# Patient Record
Sex: FEMALE | Race: WHITE | NOT HISPANIC OR LATINO
[De-identification: names, ages, dates, MRNs, and addresses within clinical notes are randomized per-mention and may not be internally consistent; named-entity substitution may affect disease eponyms.]

---

## 2020-03-23 ENCOUNTER — APPOINTMENT (OUTPATIENT)
Dept: OBGYN | Facility: CLINIC | Age: 40
End: 2020-03-23

## 2020-05-27 ENCOUNTER — LABORATORY RESULT (OUTPATIENT)
Age: 40
End: 2020-05-27

## 2020-05-27 ENCOUNTER — OUTPATIENT (OUTPATIENT)
Dept: OUTPATIENT SERVICES | Facility: HOSPITAL | Age: 40
LOS: 1 days | End: 2020-05-27
Payer: MEDICAID

## 2020-05-27 ENCOUNTER — APPOINTMENT (OUTPATIENT)
Dept: OBGYN | Facility: CLINIC | Age: 40
End: 2020-05-27
Payer: MEDICAID

## 2020-05-27 VITALS — WEIGHT: 130 LBS | DIASTOLIC BLOOD PRESSURE: 70 MMHG | SYSTOLIC BLOOD PRESSURE: 120 MMHG

## 2020-05-27 DIAGNOSIS — Z00.00 ENCOUNTER FOR GENERAL ADULT MEDICAL EXAMINATION W/OUT ABNORMAL FINDINGS: ICD-10-CM

## 2020-05-27 DIAGNOSIS — N76.0 ACUTE VAGINITIS: ICD-10-CM

## 2020-05-27 PROCEDURE — 86780 TREPONEMA PALLIDUM: CPT

## 2020-05-27 PROCEDURE — 87591 N.GONORRHOEAE DNA AMP PROB: CPT

## 2020-05-27 PROCEDURE — G0463: CPT

## 2020-05-27 PROCEDURE — 87624 HPV HI-RISK TYP POOLED RSLT: CPT

## 2020-05-27 PROCEDURE — 99203 OFFICE O/P NEW LOW 30 MIN: CPT | Mod: GC

## 2020-05-27 PROCEDURE — 87491 CHLMYD TRACH DNA AMP PROBE: CPT

## 2020-05-27 PROCEDURE — 87389 HIV-1 AG W/HIV-1&-2 AB AG IA: CPT

## 2020-05-27 PROCEDURE — 87340 HEPATITIS B SURFACE AG IA: CPT

## 2020-05-28 LAB
C TRACH RRNA SPEC QL NAA+PROBE: SIGNIFICANT CHANGE UP
HBV SURFACE AG SER-ACNC: SIGNIFICANT CHANGE UP
HIV 1+2 AB+HIV1 P24 AG SERPL QL IA: SIGNIFICANT CHANGE UP
HPV HIGH+LOW RISK DNA PNL CVX: SIGNIFICANT CHANGE UP
N GONORRHOEA RRNA SPEC QL NAA+PROBE: SIGNIFICANT CHANGE UP
SPECIMEN SOURCE: SIGNIFICANT CHANGE UP
T PALLIDUM AB TITR SER: NEGATIVE — SIGNIFICANT CHANGE UP

## 2020-05-28 NOTE — HISTORY OF PRESENT ILLNESS
[___ Month(s) Ago] : [unfilled] month(s) ago [Good] : being in good health [Regular Exercise] : regular exercise [Healthy Diet] : a healthy diet [Reproductive Age] : is of reproductive age [HPV Vaccine NA Due to Age] : HPV vaccine not available to patient due to age [Dysmenorrhea] : dysmenorrhea [Non-opiod Analgesic] : improved by non-opiod analgesic [Definite:  ___ (Date)] : the last menstrual period was [unfilled] [Sexually Active] : is sexually active [Monogamous] : is monogamous [Male ___] : [unfilled] male [de-identified] : states that she saw her PCP [Fever] : no fever [Vomiting] : no vomiting [Nausea] : no nausea [Vaginal Bleeding] : no vaginal bleeding [Vaginal Discharge] : no vaginal discharge [Diarrhea] : no diarrhea [Pain with BMs] : no pain with bowel movements [Dysuria] : no dysuria [Contraception] : does not use contraception [Hot Flashes] : no hot flashes

## 2020-05-28 NOTE — END OF VISIT
[] : Resident [FreeTextEntry3] : Case discussed with resident, agree with management plan as above.\par \par Raquel Montalvo MD\par

## 2020-05-28 NOTE — PHYSICAL EXAM
[Alert] : alert [Awake] : awake [Soft] : soft [Oriented x3] : oriented to person, place, and time [No Lesions] : no genitalia lesions [Pink Rugae] : pink rugae [Labia Majora] : labia major [Normal] : cervix [No Bleeding] : there was no active vaginal bleeding [Acute Distress] : no acute distress [LAD] : no lymphadenopathy [Thyroid Nodule] : no thyroid nodule [Goiter] : no goiter [Nipple Discharge] : no nipple discharge [Mass] : no breast mass [Tender] : non tender [Distended] : not distended [Axillary LAD] : no axillary lymphadenopathy [H/Smegaly] : no hepatosplenomegaly [Depressed Mood] : not depressed [Flat Affect] : affect not flat [Adnexa Tenderness] : were not tender [Ovarian Mass (___ Cm)] : there were no adnexal masses [FreeTextEntry7] : mid-position, mobile, ~10cm, nontender, palpable smooth irregularity anteriorly likely fibroid

## 2020-05-29 DIAGNOSIS — Z80.3 FAMILY HISTORY OF MALIGNANT NEOPLASM OF BREAST: ICD-10-CM

## 2020-05-29 DIAGNOSIS — Z80.41 FAMILY HISTORY OF MALIGNANT NEOPLASM OF OVARY: ICD-10-CM

## 2020-05-29 DIAGNOSIS — D25.9 LEIOMYOMA OF UTERUS, UNSPECIFIED: ICD-10-CM

## 2020-05-29 LAB — CYTOLOGY SPEC DOC CYTO: SIGNIFICANT CHANGE UP

## 2020-06-08 ENCOUNTER — OUTPATIENT (OUTPATIENT)
Dept: OUTPATIENT SERVICES | Facility: HOSPITAL | Age: 40
LOS: 1 days | End: 2020-06-08
Payer: MEDICAID

## 2020-06-08 ENCOUNTER — APPOINTMENT (OUTPATIENT)
Dept: ULTRASOUND IMAGING | Facility: IMAGING CENTER | Age: 40
End: 2020-06-08
Payer: MEDICAID

## 2020-06-08 ENCOUNTER — APPOINTMENT (OUTPATIENT)
Dept: MAMMOGRAPHY | Facility: IMAGING CENTER | Age: 40
End: 2020-06-08
Payer: MEDICAID

## 2020-06-08 DIAGNOSIS — D25.9 LEIOMYOMA OF UTERUS, UNSPECIFIED: ICD-10-CM

## 2020-06-08 DIAGNOSIS — Z00.8 ENCOUNTER FOR OTHER GENERAL EXAMINATION: ICD-10-CM

## 2020-06-08 PROCEDURE — 77067 SCR MAMMO BI INCL CAD: CPT | Mod: 26

## 2020-06-08 PROCEDURE — 76856 US EXAM PELVIC COMPLETE: CPT | Mod: 26

## 2020-06-08 PROCEDURE — 76830 TRANSVAGINAL US NON-OB: CPT | Mod: 26

## 2020-06-08 PROCEDURE — 77063 BREAST TOMOSYNTHESIS BI: CPT

## 2020-06-08 PROCEDURE — 77063 BREAST TOMOSYNTHESIS BI: CPT | Mod: 26

## 2020-06-08 PROCEDURE — 76856 US EXAM PELVIC COMPLETE: CPT

## 2020-06-08 PROCEDURE — 76830 TRANSVAGINAL US NON-OB: CPT

## 2020-06-08 PROCEDURE — 77067 SCR MAMMO BI INCL CAD: CPT

## 2020-06-22 ENCOUNTER — TRANSCRIPTION ENCOUNTER (OUTPATIENT)
Age: 40
End: 2020-06-22

## 2021-03-29 ENCOUNTER — APPOINTMENT (OUTPATIENT)
Dept: ULTRASOUND IMAGING | Facility: CLINIC | Age: 41
End: 2021-03-29
Payer: MEDICAID

## 2021-03-29 ENCOUNTER — OUTPATIENT (OUTPATIENT)
Dept: OUTPATIENT SERVICES | Facility: HOSPITAL | Age: 41
LOS: 1 days | End: 2021-03-29
Payer: MEDICAID

## 2021-03-29 DIAGNOSIS — Z00.8 ENCOUNTER FOR OTHER GENERAL EXAMINATION: ICD-10-CM

## 2021-03-29 PROCEDURE — 76536 US EXAM OF HEAD AND NECK: CPT | Mod: 26

## 2021-03-29 PROCEDURE — 76536 US EXAM OF HEAD AND NECK: CPT

## 2021-05-31 ENCOUNTER — APPOINTMENT (OUTPATIENT)
Dept: MAMMOGRAPHY | Facility: IMAGING CENTER | Age: 41
End: 2021-05-31

## 2021-06-07 ENCOUNTER — RESULT REVIEW (OUTPATIENT)
Age: 41
End: 2021-06-07

## 2021-06-07 ENCOUNTER — APPOINTMENT (OUTPATIENT)
Dept: MAMMOGRAPHY | Facility: IMAGING CENTER | Age: 41
End: 2021-06-07
Payer: MEDICAID

## 2021-06-07 ENCOUNTER — OUTPATIENT (OUTPATIENT)
Dept: OUTPATIENT SERVICES | Facility: HOSPITAL | Age: 41
LOS: 1 days | End: 2021-06-07
Payer: MEDICAID

## 2021-06-07 DIAGNOSIS — Z00.8 ENCOUNTER FOR OTHER GENERAL EXAMINATION: ICD-10-CM

## 2021-06-07 PROCEDURE — 77067 SCR MAMMO BI INCL CAD: CPT | Mod: 26

## 2021-06-07 PROCEDURE — 77063 BREAST TOMOSYNTHESIS BI: CPT | Mod: 26

## 2021-06-07 PROCEDURE — 77067 SCR MAMMO BI INCL CAD: CPT

## 2021-06-07 PROCEDURE — 77063 BREAST TOMOSYNTHESIS BI: CPT

## 2021-06-09 DIAGNOSIS — Z12.39 ENCOUNTER FOR OTHER SCREENING FOR MALIGNANT NEOPLASM OF BREAST: ICD-10-CM

## 2021-08-17 ENCOUNTER — APPOINTMENT (OUTPATIENT)
Dept: SURGERY | Facility: CLINIC | Age: 41
End: 2021-08-17
Payer: MEDICAID

## 2021-08-17 VITALS
HEIGHT: 64 IN | BODY MASS INDEX: 20.49 KG/M2 | SYSTOLIC BLOOD PRESSURE: 102 MMHG | HEART RATE: 92 BPM | DIASTOLIC BLOOD PRESSURE: 67 MMHG | WEIGHT: 120 LBS

## 2021-08-17 DIAGNOSIS — Z80.3 FAMILY HISTORY OF MALIGNANT NEOPLASM OF BREAST: ICD-10-CM

## 2021-08-17 DIAGNOSIS — Z78.9 OTHER SPECIFIED HEALTH STATUS: ICD-10-CM

## 2021-08-17 DIAGNOSIS — Z80.8 FAMILY HISTORY OF MALIGNANT NEOPLASM OF OTHER ORGANS OR SYSTEMS: ICD-10-CM

## 2021-08-17 PROCEDURE — 99204 OFFICE O/P NEW MOD 45 MIN: CPT

## 2021-08-17 NOTE — HISTORY OF PRESENT ILLNESS
[de-identified] : Pt c/o thyroid cyst since March,  increasing in size, pain, hoarseness, Dysphagia and SOB.  denies RT exposure. \par sonogram: Left thyroid complex cyst 4.2 cm\par FNA (CBL) no follicular cells,  cyst with hemosiderin laden macrophages\par normal TFTs\par I have reviewed all old and new data and available images.\par

## 2021-08-17 NOTE — ASSESSMENT
[FreeTextEntry1] : in view of size, symptoms and tracheal deviation, have recommended thyroid lobectomy with frozen section, possible total thyroidectomy.  risks, benefits and alternatives discussed at length.  I have discussed with the patient the anatomy of the area, the pathophysiology of the disease process and the rationale for surgery.  The attendant risks, possible complications and expected postoperative course have been discussed in detail.  I have given the patient the opportunity to ask questions, and all questions have been answered to the patient's satisfaction, and they wish to proceed with the planned procedure.  to be scheduled ambulatory at [facility].  \par

## 2021-08-17 NOTE — CONSULT LETTER
[Dear  ___] : Dear  [unfilled], [Consult Letter:] : I had the pleasure of evaluating your patient, [unfilled]. [Please see my note below.] : Please see my note below. [Consult Closing:] : Thank you very much for allowing me to participate in the care of this patient.  If you have any questions, please do not hesitate to contact me. [Sincerely,] : Sincerely, [FreeTextEntry2] : Dr. Ynes Espinoza, Dr. Werner Whyte [DrArlene  ___] : Dr. WHITAKER [FreeTextEntry3] : Matthew Perera MD, FACS\par System Director, Endocrine Surgery\par [facility]\par Assistant Clinical Professor of Surgery\par [employer] [facility] [employer] [facility]

## 2021-08-17 NOTE — PHYSICAL EXAM
[de-identified] : 4.5 cm left thyroid nodule, well circumscribed and mobile [Laryngoscopy Performed] : laryngoscopy was performed, see procedure section for findings [R] : deviated to the right [Normal] : orientation to person, place, and time: normal [de-identified] : indirect  laryngoscopy shows normal vocal cord mobility bilaterally with no lesions noted

## 2021-10-15 ENCOUNTER — OUTPATIENT (OUTPATIENT)
Dept: OUTPATIENT SERVICES | Facility: HOSPITAL | Age: 41
LOS: 1 days | End: 2021-10-15

## 2021-10-15 VITALS
SYSTOLIC BLOOD PRESSURE: 110 MMHG | WEIGHT: 121.92 LBS | TEMPERATURE: 97 F | HEART RATE: 76 BPM | OXYGEN SATURATION: 99 % | HEIGHT: 61.5 IN | DIASTOLIC BLOOD PRESSURE: 70 MMHG | RESPIRATION RATE: 16 BRPM

## 2021-10-15 DIAGNOSIS — E04.1 NONTOXIC SINGLE THYROID NODULE: ICD-10-CM

## 2021-10-15 LAB
ANION GAP SERPL CALC-SCNC: 11 MMOL/L — SIGNIFICANT CHANGE UP (ref 7–14)
BLD GP AB SCN SERPL QL: NEGATIVE — SIGNIFICANT CHANGE UP
BUN SERPL-MCNC: 10 MG/DL — SIGNIFICANT CHANGE UP (ref 7–23)
CALCIUM SERPL-MCNC: 9 MG/DL — SIGNIFICANT CHANGE UP (ref 8.4–10.5)
CHLORIDE SERPL-SCNC: 103 MMOL/L — SIGNIFICANT CHANGE UP (ref 98–107)
CO2 SERPL-SCNC: 26 MMOL/L — SIGNIFICANT CHANGE UP (ref 22–31)
CREAT SERPL-MCNC: 0.86 MG/DL — SIGNIFICANT CHANGE UP (ref 0.5–1.3)
GLUCOSE SERPL-MCNC: 88 MG/DL — SIGNIFICANT CHANGE UP (ref 70–99)
HCG UR QL: NEGATIVE — SIGNIFICANT CHANGE UP
HCT VFR BLD CALC: 39.4 % — SIGNIFICANT CHANGE UP (ref 34.5–45)
HGB BLD-MCNC: 14.1 G/DL — SIGNIFICANT CHANGE UP (ref 11.5–15.5)
MCHC RBC-ENTMCNC: 31.3 PG — SIGNIFICANT CHANGE UP (ref 27–34)
MCHC RBC-ENTMCNC: 35.8 GM/DL — SIGNIFICANT CHANGE UP (ref 32–36)
MCV RBC AUTO: 87.6 FL — SIGNIFICANT CHANGE UP (ref 80–100)
NRBC # BLD: 0 /100 WBCS — SIGNIFICANT CHANGE UP
NRBC # FLD: 0 K/UL — SIGNIFICANT CHANGE UP
PLATELET # BLD AUTO: 240 K/UL — SIGNIFICANT CHANGE UP (ref 150–400)
POTASSIUM SERPL-MCNC: 3.6 MMOL/L — SIGNIFICANT CHANGE UP (ref 3.5–5.3)
POTASSIUM SERPL-SCNC: 3.6 MMOL/L — SIGNIFICANT CHANGE UP (ref 3.5–5.3)
RBC # BLD: 4.5 M/UL — SIGNIFICANT CHANGE UP (ref 3.8–5.2)
RBC # FLD: 11.7 % — SIGNIFICANT CHANGE UP (ref 10.3–14.5)
RH IG SCN BLD-IMP: NEGATIVE — SIGNIFICANT CHANGE UP
SODIUM SERPL-SCNC: 140 MMOL/L — SIGNIFICANT CHANGE UP (ref 135–145)
WBC # BLD: 5.02 K/UL — SIGNIFICANT CHANGE UP (ref 3.8–10.5)
WBC # FLD AUTO: 5.02 K/UL — SIGNIFICANT CHANGE UP (ref 3.8–10.5)

## 2021-10-15 RX ORDER — SODIUM CHLORIDE 9 MG/ML
1000 INJECTION, SOLUTION INTRAVENOUS
Refills: 0 | Status: DISCONTINUED | OUTPATIENT
Start: 2021-11-01 | End: 2021-11-15

## 2021-10-15 NOTE — H&P PST ADULT - PROBLEM SELECTOR PLAN 1
Left Thyroid Nodule, Possible Total, Possible Paratracheal Node Dissection    Pre op instructions reviewed with pt including Hibiclens with teach back and  Pepcid ; pt verbalized good understanding of pre op instructions    Cup provided for UCG dos

## 2021-10-15 NOTE — H&P PST ADULT - HISTORY OF PRESENT ILLNESS
Pt is a 41 y.o. female ; pt noted " bump left side thyroid " first noted 3/21. Pt states 5/21 noted increase in size ; Pt s/p drainage  and biopsy 5/21; pt referred to surgeon ; pt now presents for Left Thyroid Lobectomy possible Total , , Possib;e paratracheal Node Dissection  Pt is a 41 y.o. female ; pt noted " bump left side thyroid " first noted 3/21. Pt reports hoarseness Pt states "  it  increase in size"  ; Pt s/p drainage  and biopsy 5/21; pt referred to surgeon ; pt now presents for Left Thyroid Lobectomy possible Total  Possible Paratracheal Node Dissection  Pt is a 41 y.o. female ; pt noted " bump left side thyroid " first noted 3/21.  Pt states "  it  increased  in size"  ; Pt s/p drainage  and biopsy 5/21; pt referred to surgeon ; pt now presents for Left Thyroid Lobectomy possible Total  Possible Paratracheal Node Dissection     Pt reports h/o hoarseness

## 2021-10-15 NOTE — H&P PST ADULT - ENDOCRINE
details… No Repair - Repaired With Adjacent Surgical Defect Text (Leave Blank If You Do Not Want): After the excision the defect was repaired concurrently with another surgical defect which was in close approximation.

## 2021-10-15 NOTE — H&P PST ADULT - NSICDXFAMILYHX_GEN_ALL_CORE_FT
FAMILY HISTORY:  Sibling  Still living? Yes, Estimated age: Age Unknown  Family history of uterine cancer, Age at diagnosis: Age Unknown    Child  Still living? Yes, Estimated age: Age Unknown  FH: thyroid cancer, Age at diagnosis: Age Unknown

## 2021-10-25 PROBLEM — D25.9 LEIOMYOMA OF UTERUS, UNSPECIFIED: Chronic | Status: ACTIVE | Noted: 2021-10-15

## 2021-10-27 DIAGNOSIS — Z01.818 ENCOUNTER FOR OTHER PREPROCEDURAL EXAMINATION: ICD-10-CM

## 2021-10-29 ENCOUNTER — APPOINTMENT (OUTPATIENT)
Dept: DISASTER EMERGENCY | Facility: CLINIC | Age: 41
End: 2021-10-29

## 2021-10-29 LAB — SARS-COV-2 N GENE NPH QL NAA+PROBE: NOT DETECTED

## 2021-10-31 ENCOUNTER — TRANSCRIPTION ENCOUNTER (OUTPATIENT)
Age: 41
End: 2021-10-31

## 2021-11-01 ENCOUNTER — OUTPATIENT (OUTPATIENT)
Dept: OUTPATIENT SERVICES | Facility: HOSPITAL | Age: 41
LOS: 1 days | Discharge: HOME | End: 2021-11-01
Payer: MEDICAID

## 2021-11-01 ENCOUNTER — APPOINTMENT (OUTPATIENT)
Dept: SURGERY | Facility: HOSPITAL | Age: 41
End: 2021-11-01

## 2021-11-01 ENCOUNTER — RESULT REVIEW (OUTPATIENT)
Age: 41
End: 2021-11-01

## 2021-11-01 VITALS
HEART RATE: 89 BPM | SYSTOLIC BLOOD PRESSURE: 120 MMHG | RESPIRATION RATE: 17 BRPM | OXYGEN SATURATION: 98 % | DIASTOLIC BLOOD PRESSURE: 84 MMHG

## 2021-11-01 VITALS
HEIGHT: 61 IN | HEART RATE: 70 BPM | RESPIRATION RATE: 16 BRPM | SYSTOLIC BLOOD PRESSURE: 111 MMHG | TEMPERATURE: 99 F | WEIGHT: 121.92 LBS | OXYGEN SATURATION: 99 % | DIASTOLIC BLOOD PRESSURE: 81 MMHG

## 2021-11-01 DIAGNOSIS — E04.1 NONTOXIC SINGLE THYROID NODULE: ICD-10-CM

## 2021-11-01 LAB — HCG UR QL: NEGATIVE — SIGNIFICANT CHANGE UP

## 2021-11-01 PROCEDURE — 13132 CMPLX RPR F/C/C/M/N/AX/G/H/F: CPT | Mod: 59

## 2021-11-01 PROCEDURE — 60220 PARTIAL REMOVAL OF THYROID: CPT

## 2021-11-01 PROCEDURE — 88307 TISSUE EXAM BY PATHOLOGIST: CPT | Mod: 26

## 2021-11-01 PROCEDURE — 88173 CYTOPATH EVAL FNA REPORT: CPT | Mod: 26

## 2021-11-01 PROCEDURE — 88331 PATH CONSLTJ SURG 1 BLK 1SPC: CPT | Mod: 26

## 2021-11-01 PROCEDURE — 88305 TISSUE EXAM BY PATHOLOGIST: CPT | Mod: 26

## 2021-11-01 RX ORDER — BENZOCAINE AND MENTHOL 5; 1 G/100ML; G/100ML
1 LIQUID ORAL
Refills: 0 | Status: DISCONTINUED | OUTPATIENT
Start: 2021-11-01 | End: 2021-11-15

## 2021-11-01 RX ORDER — FENTANYL CITRATE 50 UG/ML
25 INJECTION INTRAVENOUS
Refills: 0 | Status: DISCONTINUED | OUTPATIENT
Start: 2021-11-01 | End: 2021-11-01

## 2021-11-01 RX ORDER — OXYCODONE AND ACETAMINOPHEN 5; 325 MG/1; MG/1
1 TABLET ORAL EVERY 6 HOURS
Refills: 0 | Status: DISCONTINUED | OUTPATIENT
Start: 2021-11-01 | End: 2021-11-01

## 2021-11-01 RX ORDER — ONDANSETRON 8 MG/1
4 TABLET, FILM COATED ORAL ONCE
Refills: 0 | Status: DISCONTINUED | OUTPATIENT
Start: 2021-11-01 | End: 2021-11-15

## 2021-11-01 RX ORDER — ACETAMINOPHEN 500 MG
2 TABLET ORAL
Qty: 0 | Refills: 0 | DISCHARGE
Start: 2021-11-01

## 2021-11-01 RX ORDER — ACETAMINOPHEN 500 MG
650 TABLET ORAL EVERY 6 HOURS
Refills: 0 | Status: DISCONTINUED | OUTPATIENT
Start: 2021-11-01 | End: 2021-11-15

## 2021-11-01 RX ORDER — OXYCODONE HYDROCHLORIDE 5 MG/1
5 TABLET ORAL ONCE
Refills: 0 | Status: DISCONTINUED | OUTPATIENT
Start: 2021-11-01 | End: 2021-11-01

## 2021-11-01 RX ORDER — KETOROLAC TROMETHAMINE 30 MG/ML
30 SYRINGE (ML) INJECTION ONCE
Refills: 0 | Status: DISCONTINUED | OUTPATIENT
Start: 2021-11-01 | End: 2021-11-01

## 2021-11-01 RX ADMIN — FENTANYL CITRATE 25 MICROGRAM(S): 50 INJECTION INTRAVENOUS at 11:00

## 2021-11-01 RX ADMIN — FENTANYL CITRATE 25 MICROGRAM(S): 50 INJECTION INTRAVENOUS at 09:50

## 2021-11-01 RX ADMIN — FENTANYL CITRATE 25 MICROGRAM(S): 50 INJECTION INTRAVENOUS at 10:05

## 2021-11-01 RX ADMIN — Medication 30 MILLIGRAM(S): at 10:15

## 2021-11-01 RX ADMIN — Medication 30 MILLIGRAM(S): at 09:51

## 2021-11-01 NOTE — BRIEF OPERATIVE NOTE - OPERATION/FINDINGS
Left thyroid cyst aspirated, contents sent for cytopathology. Left thyroid lobectomy with RCN visualized and preserved. Frozen significant for follicular cyst.

## 2021-11-01 NOTE — PROVIDER CONTACT NOTE (OTHER) - ACTION/TREATMENT ORDERED:
As per Dr. Denis, pt is ok to have 1 dose of IV Ketorolac. Spoke with Dr. Wolf, 30mg Ketorolac ordered.

## 2021-11-01 NOTE — ASU DISCHARGE PLAN (ADULT/PEDIATRIC) - PROCEDURE
Left message on mom and dad's phone that script will be ready for  on 4/4/2017.    Patient will come to [address] office to : prescription.   Patient was advised of location and hours: Yes.   Patient was advised to bring photo ID: Yes.   Patient elects another party to  item: yes, name: Candance or Shalom, Mother or Father.        left thyroid lobectomy and isthmusectomy

## 2021-11-01 NOTE — ASU DISCHARGE PLAN (ADULT/PEDIATRIC) - CARE PROVIDER_API CALL
Matthew Perera)  Plastic Surgery  [address] [place] [address] [address]  [place] [address]  Phone: [phone]  Fax: [phone]  Follow Up Time:

## 2021-11-02 ENCOUNTER — RESULT REVIEW (OUTPATIENT)
Age: 41
End: 2021-11-02

## 2021-11-02 ENCOUNTER — APPOINTMENT (OUTPATIENT)
Dept: SURGERY | Facility: CLINIC | Age: 41
End: 2021-11-02
Payer: MEDICAID

## 2021-11-02 LAB — NON-GYNECOLOGICAL CYTOLOGY STUDY: SIGNIFICANT CHANGE UP

## 2021-11-02 PROCEDURE — 99024 POSTOP FOLLOW-UP VISIT: CPT

## 2021-11-02 NOTE — PHYSICAL EXAM
[de-identified] : w [Midline] : located in midline position [Normal] : orientation to person, place, and time: normal

## 2021-11-04 LAB — SURGICAL PATHOLOGY STUDY: SIGNIFICANT CHANGE UP

## 2021-11-09 ENCOUNTER — NON-APPOINTMENT (OUTPATIENT)
Age: 41
End: 2021-11-09

## 2021-11-17 ENCOUNTER — NON-APPOINTMENT (OUTPATIENT)
Age: 41
End: 2021-11-17

## 2021-12-14 ENCOUNTER — APPOINTMENT (OUTPATIENT)
Dept: SURGERY | Facility: CLINIC | Age: 41
End: 2021-12-14
Payer: MEDICAID

## 2021-12-14 PROCEDURE — 36415 COLL VENOUS BLD VENIPUNCTURE: CPT

## 2021-12-14 PROCEDURE — 99024 POSTOP FOLLOW-UP VISIT: CPT

## 2021-12-14 NOTE — PHYSICAL EXAM
[de-identified] : well healed scar [Midline] : located in midline position [Normal] : orientation to person, place, and time: normal

## 2021-12-14 NOTE — HISTORY OF PRESENT ILLNESS
[de-identified] : Pt 6 weeks s/p thyroid lobectomy for benign disease doing well without complaints

## 2021-12-15 ENCOUNTER — NON-APPOINTMENT (OUTPATIENT)
Age: 41
End: 2021-12-15

## 2021-12-15 LAB
ALBUMIN SERPL ELPH-MCNC: 4.3 G/DL
ALP BLD-CCNC: 36 U/L
ALT SERPL-CCNC: 14 U/L
ANION GAP SERPL CALC-SCNC: 11 MMOL/L
AST SERPL-CCNC: 11 U/L
BILIRUB SERPL-MCNC: 0.2 MG/DL
BUN SERPL-MCNC: 15 MG/DL
CALCIUM SERPL-MCNC: 9.1 MG/DL
CHLORIDE SERPL-SCNC: 105 MMOL/L
CO2 SERPL-SCNC: 25 MMOL/L
CREAT SERPL-MCNC: 0.96 MG/DL
GLUCOSE SERPL-MCNC: 100 MG/DL
POTASSIUM SERPL-SCNC: 4.4 MMOL/L
PROT SERPL-MCNC: 6.4 G/DL
SODIUM SERPL-SCNC: 141 MMOL/L
T3 SERPL-MCNC: 106 NG/DL
T4 FREE SERPL-MCNC: 1.3 NG/DL
TSH SERPL-ACNC: 1.44 UIU/ML

## 2021-12-16 LAB
THYROGLOB AB SERPL-ACNC: <20 IU/ML
THYROPEROXIDASE AB SERPL IA-ACNC: 41.8 IU/ML

## 2021-12-22 ENCOUNTER — NON-APPOINTMENT (OUTPATIENT)
Age: 41
End: 2021-12-22

## 2022-04-28 ENCOUNTER — APPOINTMENT (OUTPATIENT)
Dept: SURGERY | Facility: CLINIC | Age: 42
End: 2022-04-28
Payer: MEDICAID

## 2022-04-28 DIAGNOSIS — E04.1 NONTOXIC SINGLE THYROID NODULE: ICD-10-CM

## 2022-04-28 PROCEDURE — 99214 OFFICE O/P EST MOD 30 MIN: CPT | Mod: 25

## 2022-04-28 NOTE — PHYSICAL EXAM
[de-identified] : well healed scar [Midline] : located in midline position [Normal] : orientation to person, place, and time: normal

## 2022-04-28 NOTE — HISTORY OF PRESENT ILLNESS
[de-identified] : Pt 6 months s/p thyroid lobectomy for benign disease feels well on no Synthroid all reports reviewed

## 2022-04-28 NOTE — ASSESSMENT
[FreeTextEntry1] : s/p Thyroid lobectomy\par daily care\par blood work in office\par sono next visit\par f/u 6 months

## 2022-04-29 ENCOUNTER — NON-APPOINTMENT (OUTPATIENT)
Age: 42
End: 2022-04-29

## 2022-04-29 LAB
24R-OH-CALCIDIOL SERPL-MCNC: 56.5 PG/ML
25(OH)D3 SERPL-MCNC: 33.6 NG/ML
ALBUMIN SERPL ELPH-MCNC: 3.9 G/DL
ALP BLD-CCNC: 35 U/L
ALT SERPL-CCNC: 16 U/L
ANION GAP SERPL CALC-SCNC: 11 MMOL/L
AST SERPL-CCNC: 10 U/L
BILIRUB SERPL-MCNC: 0.2 MG/DL
BUN SERPL-MCNC: 17 MG/DL
CALCIUM SERPL-MCNC: 8.6 MG/DL
CHLORIDE SERPL-SCNC: 104 MMOL/L
CO2 SERPL-SCNC: 25 MMOL/L
CREAT SERPL-MCNC: 0.83 MG/DL
EGFR: 90 ML/MIN/1.73M2
GLUCOSE SERPL-MCNC: 85 MG/DL
POTASSIUM SERPL-SCNC: 3.9 MMOL/L
PROT SERPL-MCNC: 6.1 G/DL
SODIUM SERPL-SCNC: 140 MMOL/L
T3 SERPL-MCNC: 114 NG/DL
T4 FREE SERPL-MCNC: 1.4 NG/DL
TSH SERPL-ACNC: 1.93 UIU/ML
VIT B12 SERPL-MCNC: 521 PG/ML

## 2022-05-02 ENCOUNTER — NON-APPOINTMENT (OUTPATIENT)
Age: 42
End: 2022-05-02

## 2022-05-02 LAB
THYROGLOB AB SERPL-ACNC: <20 IU/ML
THYROPEROXIDASE AB SERPL IA-ACNC: 18.1 IU/ML

## 2022-05-09 ENCOUNTER — NON-APPOINTMENT (OUTPATIENT)
Age: 42
End: 2022-05-09

## 2022-06-06 ENCOUNTER — APPOINTMENT (OUTPATIENT)
Dept: OBGYN | Facility: CLINIC | Age: 42
End: 2022-06-06
Payer: MEDICAID

## 2022-06-06 VITALS
SYSTOLIC BLOOD PRESSURE: 102 MMHG | DIASTOLIC BLOOD PRESSURE: 62 MMHG | HEART RATE: 85 BPM | BODY MASS INDEX: 21 KG/M2 | OXYGEN SATURATION: 99 % | TEMPERATURE: 97.3 F | HEIGHT: 64 IN | WEIGHT: 123 LBS

## 2022-06-06 DIAGNOSIS — Z01.419 ENCOUNTER FOR GYNECOLOGICAL EXAMINATION (GENERAL) (ROUTINE) W/OUT ABNORMAL FINDINGS: ICD-10-CM

## 2022-06-06 DIAGNOSIS — N95.1 MENOPAUSAL AND FEMALE CLIMACTERIC STATES: ICD-10-CM

## 2022-06-06 LAB
HCG UR QL: NEGATIVE
QUALITY CONTROL: YES

## 2022-06-06 PROCEDURE — 99396 PREV VISIT EST AGE 40-64: CPT

## 2022-06-06 NOTE — PLAN
[FreeTextEntry1] : \par TVUS to eval myomatous uterus\par \par I spent the time noted on the day of this patient encounter preparing for, providing and documenting the above E/M service and counseling and educate patient on differential, workup, disease course, and treatment/management. Education was provided to the patient during this encounter. All questions and concerns were answered and addressed in detail.\par \par Mila Garcia MD\par

## 2022-06-06 NOTE — HISTORY OF PRESENT ILLNESS
[FreeTextEntry1] : 40 yo P___ with LMP ___ presents today for well woman exam.\par \par  Nelia\par \par POB: denies\par PGYN: reg, however shortened flow, denies pelvic infections or abl pap\par PMH: denies\par PSH: neck cyst removal\par Med:  denies\par All: NKMA\par SH: denies\par FH: fh of breast and uterine CA\par

## 2022-06-13 LAB
C TRACH RRNA SPEC QL NAA+PROBE: NOT DETECTED
CANDIDA VAG CYTO: NOT DETECTED
CYTOLOGY CVX/VAG DOC THIN PREP: NORMAL
ESTRADIOL SERPL-MCNC: 187 PG/ML
FSH: 3.6 MIU/ML
G VAGINALIS+PREV SP MTYP VAG QL MICRO: NOT DETECTED
HPV HIGH+LOW RISK DNA PNL CVX: DETECTED
N GONORRHOEA RRNA SPEC QL NAA+PROBE: NOT DETECTED
PROLACTIN SERPL-MCNC: 18.6 NG/ML
SOURCE AMPLIFICATION: NORMAL
T VAGINALIS VAG QL WET PREP: NOT DETECTED

## 2022-06-15 ENCOUNTER — NON-APPOINTMENT (OUTPATIENT)
Age: 42
End: 2022-06-15

## 2022-07-18 ENCOUNTER — OUTPATIENT (OUTPATIENT)
Dept: OUTPATIENT SERVICES | Facility: HOSPITAL | Age: 42
LOS: 1 days | End: 2022-07-18
Payer: MEDICAID

## 2022-07-18 ENCOUNTER — RESULT REVIEW (OUTPATIENT)
Age: 42
End: 2022-07-18

## 2022-07-18 ENCOUNTER — APPOINTMENT (OUTPATIENT)
Dept: ULTRASOUND IMAGING | Facility: HOSPITAL | Age: 42
End: 2022-07-18

## 2022-07-18 ENCOUNTER — APPOINTMENT (OUTPATIENT)
Dept: MAMMOGRAPHY | Facility: HOSPITAL | Age: 42
End: 2022-07-18

## 2022-07-18 DIAGNOSIS — Z01.419 ENCOUNTER FOR GYNECOLOGICAL EXAMINATION (GENERAL) (ROUTINE) WITHOUT ABNORMAL FINDINGS: ICD-10-CM

## 2022-07-18 PROCEDURE — 76830 TRANSVAGINAL US NON-OB: CPT | Mod: 26

## 2022-07-18 PROCEDURE — 77067 SCR MAMMO BI INCL CAD: CPT | Mod: 26

## 2022-07-18 PROCEDURE — 76641 ULTRASOUND BREAST COMPLETE: CPT

## 2022-07-18 PROCEDURE — 76641 ULTRASOUND BREAST COMPLETE: CPT | Mod: 26,50

## 2022-07-18 PROCEDURE — 77063 BREAST TOMOSYNTHESIS BI: CPT | Mod: 26

## 2022-07-18 PROCEDURE — 77063 BREAST TOMOSYNTHESIS BI: CPT

## 2022-07-18 PROCEDURE — 77067 SCR MAMMO BI INCL CAD: CPT

## 2022-07-18 PROCEDURE — 76830 TRANSVAGINAL US NON-OB: CPT

## 2022-08-08 ENCOUNTER — APPOINTMENT (OUTPATIENT)
Dept: OBGYN | Facility: CLINIC | Age: 42
End: 2022-08-08

## 2022-08-08 VITALS
OXYGEN SATURATION: 98 % | WEIGHT: 123 LBS | TEMPERATURE: 97.2 F | HEART RATE: 87 BPM | HEIGHT: 64 IN | BODY MASS INDEX: 21 KG/M2 | SYSTOLIC BLOOD PRESSURE: 110 MMHG | DIASTOLIC BLOOD PRESSURE: 60 MMHG

## 2022-08-08 DIAGNOSIS — N83.209 UNSPECIFIED OVARIAN CYST, UNSPECIFIED SIDE: ICD-10-CM

## 2022-08-08 DIAGNOSIS — K40.90 UNILATERAL INGUINAL HERNIA, W/OUT OBSTRUCTION OR GANGRENE, NOT SPECIFIED AS RECURRENT: ICD-10-CM

## 2022-08-08 DIAGNOSIS — N92.0 EXCESSIVE AND FREQUENT MENSTRUATION WITH REGULAR CYCLE: ICD-10-CM

## 2022-08-08 PROCEDURE — 99214 OFFICE O/P EST MOD 30 MIN: CPT | Mod: 57

## 2022-08-08 RX ORDER — NORETHINDRONE 0.35 MG/1
0.35 TABLET ORAL
Qty: 3 | Refills: 3 | Status: DISCONTINUED | COMMUNITY
Start: 2022-08-08 | End: 2022-08-08

## 2022-08-08 NOTE — PLAN
[FreeTextEntry1] : PT interested in surgical management of fibroid uterus in the form of myomectomy. Asked to seek consultation with general surgery to assess for inguinal hernia. Will potentially be eligible for a joint case at the time of surgery.\par \par Fu in 3 months\par \par Tumor markers ordered for 1cm lesion in left ovary, fu US in 3 mo\par \par I spent the time noted on the day of this patient encounter preparing for, providing and documenting the above E/M service and counseling and educate patient on differential, workup, disease course, and treatment/management. Education was provided to the patient during this encounter. All questions and concerns were answered and addressed in detail.\par \par Mila Garcia MD\par

## 2022-08-08 NOTE — PHYSICAL EXAM
[Chaperone Present] : A chaperone was present in the examining room during all aspects of the physical examination [Appropriately responsive] : appropriately responsive [Alert] : alert [No Acute Distress] : no acute distress [Soft] : soft [Non-distended] : non-distended [No HSM] : No HSM [No Lesions] : no lesions [No Mass] : no mass [Oriented x3] : oriented x3 [FreeTextEntry7] : exquisite tenderness to light and deep palpation in the area of the right inguinal canal. No appreciable mass

## 2022-08-08 NOTE — HISTORY OF PRESENT ILLNESS
[FreeTextEntry1] : 40 yo with heavy and painful menses, known myomatous uterus, here today for surgical consultation. Pt notes that she has monthly pelvic pain with her menses, additionally, over the last few months, she has noted an "egg-like bulge whenever she is standing or exercising. She states that the pain sometimes makes her dizzy and feels like she is going to faint. \par \par TVUS: \par FINDINGS:\par Uterus: 11.0 cm x 9.4 cm x 8.0 cm. Within normal limits. A 5.8 cm posterior lower uterine segment myoma is noted. This is shown a slight increase in size from prior study. A 3 cm right fundal myoma is noted. A 3 cm anterior and to the left uterine myoma is noted. A 2.7 cm anterior body of uterus myoma is noted. These are without significant interval change.\par Endometrium: 5 mm. Within normal limits.\par \par Right ovary: 2.1 cm x 1.7 cm x 1.5 cm. 1.1 cm cyst/follicle is seen. Satisfactory flow was noted to the right ovary.\par Left ovary: 3.1 cm x 1.8 cm x 2.6 cm. 1.1 cm cyst/follicle is seen in the left ovary.  Additional 1.2 cm cyst with echoes within versus hypoechoic lesion is seen in the left ovary. Satisfactory flow was noted to the left ovary.\par \par Fluid: None.\par \par IMPRESSION:\par Enlarged heterogeneous myomatous uterus as noted above. Bilateral 1.1 cm cyst/follicles are seen within the ovaries.\par \par 1.2 cm cyst with echoes within versus hypoechoic lesion is seen in the left ovary. Short-term sonographic follow-up examination to 3 months time is suggested to assess resolution.
no

## 2022-08-09 DIAGNOSIS — R97.1 ELEVATED CANCER ANTIGEN 125 [CA 125]: ICD-10-CM

## 2022-08-13 LAB
CA 125 (LABCORP): 67.4 U/ML
CANCER AG125 SERPL-ACNC: 62 U/ML
CANCER AG19-9 SERPL-ACNC: <2 U/ML
CEA SERPL-MCNC: 2.3 NG/ML
HE4X: 68.4 PMOL/L
POSTMENOPAUSAL ROMA: 3.51
PREMENOPAUSAL ROMA: 1.57
ROMA COMMENT: NORMAL

## 2022-08-15 ENCOUNTER — RESULT REVIEW (OUTPATIENT)
Age: 42
End: 2022-08-15

## 2022-08-24 ENCOUNTER — OUTPATIENT (OUTPATIENT)
Dept: OUTPATIENT SERVICES | Facility: HOSPITAL | Age: 42
LOS: 1 days | End: 2022-08-24
Payer: MEDICAID

## 2022-08-24 ENCOUNTER — APPOINTMENT (OUTPATIENT)
Dept: MRI IMAGING | Facility: HOSPITAL | Age: 42
End: 2022-08-24

## 2022-08-24 DIAGNOSIS — R97.1 ELEVATED CANCER ANTIGEN 125 [CA 125]: ICD-10-CM

## 2022-08-24 PROCEDURE — 74183 MRI ABD W/O CNTR FLWD CNTR: CPT | Mod: 26

## 2022-08-24 PROCEDURE — 72197 MRI PELVIS W/O & W/DYE: CPT | Mod: 26

## 2022-08-24 PROCEDURE — 74183 MRI ABD W/O CNTR FLWD CNTR: CPT

## 2022-08-24 PROCEDURE — 72197 MRI PELVIS W/O & W/DYE: CPT

## 2022-08-24 PROCEDURE — A9579: CPT

## 2022-08-25 ENCOUNTER — APPOINTMENT (OUTPATIENT)
Dept: SURGERY | Facility: CLINIC | Age: 42
End: 2022-08-25

## 2022-08-25 VITALS
DIASTOLIC BLOOD PRESSURE: 58 MMHG | TEMPERATURE: 98.2 F | HEIGHT: 64 IN | HEART RATE: 79 BPM | OXYGEN SATURATION: 99 % | WEIGHT: 122 LBS | BODY MASS INDEX: 20.83 KG/M2 | SYSTOLIC BLOOD PRESSURE: 90 MMHG

## 2022-08-25 DIAGNOSIS — R10.31 RIGHT LOWER QUADRANT PAIN: ICD-10-CM

## 2022-08-25 PROCEDURE — 99213 OFFICE O/P EST LOW 20 MIN: CPT

## 2022-09-08 ENCOUNTER — NON-APPOINTMENT (OUTPATIENT)
Age: 42
End: 2022-09-08

## 2022-09-14 ENCOUNTER — APPOINTMENT (OUTPATIENT)
Dept: GYNECOLOGIC ONCOLOGY | Facility: CLINIC | Age: 42
End: 2022-09-14

## 2022-09-14 VITALS
SYSTOLIC BLOOD PRESSURE: 108 MMHG | BODY MASS INDEX: 20.66 KG/M2 | WEIGHT: 121 LBS | DIASTOLIC BLOOD PRESSURE: 72 MMHG | HEIGHT: 64 IN

## 2022-09-14 DIAGNOSIS — Z63.5 DISRUPTION OF FAMILY BY SEPARATION AND DIVORCE: ICD-10-CM

## 2022-09-14 DIAGNOSIS — Z80.41 FAMILY HISTORY OF MALIGNANT NEOPLASM OF OVARY: ICD-10-CM

## 2022-09-14 DIAGNOSIS — D25.9 LEIOMYOMA OF UTERUS, UNSPECIFIED: ICD-10-CM

## 2022-09-14 PROCEDURE — 99204 OFFICE O/P NEW MOD 45 MIN: CPT

## 2022-09-14 SDOH — SOCIAL STABILITY - SOCIAL INSECURITY: DISRUPTION OF FAMILY BY SEPARATION AND DIVORCE: Z63.5

## 2022-09-19 ENCOUNTER — NON-APPOINTMENT (OUTPATIENT)
Age: 42
End: 2022-09-19

## 2022-09-19 ENCOUNTER — OUTPATIENT (OUTPATIENT)
Dept: OUTPATIENT SERVICES | Facility: HOSPITAL | Age: 42
LOS: 1 days | End: 2022-09-19
Payer: MEDICAID

## 2022-09-19 ENCOUNTER — APPOINTMENT (OUTPATIENT)
Dept: ULTRASOUND IMAGING | Facility: HOSPITAL | Age: 42
End: 2022-09-19

## 2022-09-19 DIAGNOSIS — E04.1 NONTOXIC SINGLE THYROID NODULE: ICD-10-CM

## 2022-09-19 PROCEDURE — 76536 US EXAM OF HEAD AND NECK: CPT | Mod: 26

## 2022-09-19 PROCEDURE — 76536 US EXAM OF HEAD AND NECK: CPT

## 2022-10-17 ENCOUNTER — APPOINTMENT (OUTPATIENT)
Dept: RADIOLOGY | Facility: HOSPITAL | Age: 42
End: 2022-10-17

## 2022-10-17 ENCOUNTER — OUTPATIENT (OUTPATIENT)
Dept: OUTPATIENT SERVICES | Facility: HOSPITAL | Age: 42
LOS: 1 days | End: 2022-10-17
Payer: MEDICAID
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
## 2022-10-17 DIAGNOSIS — M25.461 EFFUSION, RIGHT KNEE: ICD-10-CM

## 2022-10-17 DIAGNOSIS — M79.89 OTHER SPECIFIED SOFT TISSUE DISORDERS: ICD-10-CM

## 2022-10-17 DIAGNOSIS — Z91.81 HISTORY OF FALLING: ICD-10-CM

## 2022-10-17 DIAGNOSIS — M25.561 PAIN IN RIGHT KNEE: ICD-10-CM

## 2022-10-17 PROCEDURE — 73562 X-RAY EXAM OF KNEE 3: CPT | Mod: 26,RT

## 2022-10-17 PROCEDURE — 73562 X-RAY EXAM OF KNEE 3: CPT

## 2022-11-01 ENCOUNTER — APPOINTMENT (OUTPATIENT)
Dept: SURGERY | Facility: CLINIC | Age: 42
End: 2022-11-01

## 2022-11-01 DIAGNOSIS — E04.1 NONTOXIC SINGLE THYROID NODULE: ICD-10-CM

## 2022-11-01 PROCEDURE — 99214 OFFICE O/P EST MOD 30 MIN: CPT | Mod: 25

## 2022-11-01 PROCEDURE — 36415 COLL VENOUS BLD VENIPUNCTURE: CPT

## 2022-11-01 RX ORDER — CLINDAMYCIN PHOSPHATE 10 MG/ML
1 LOTION TOPICAL
Qty: 60 | Refills: 0 | Status: COMPLETED | COMMUNITY
Start: 2022-06-29

## 2022-11-01 RX ORDER — TERBINAFINE HYDROCHLORIDE 250 MG/1
250 TABLET ORAL
Qty: 30 | Refills: 0 | Status: ACTIVE | COMMUNITY
Start: 2022-10-21

## 2022-11-01 RX ORDER — LIDOCAINE 5% 700 MG/1
5 PATCH TOPICAL
Qty: 5 | Refills: 0 | Status: COMPLETED | COMMUNITY
Start: 2022-05-24

## 2022-11-01 RX ORDER — AMOXICILLIN 500 MG/1
500 TABLET, FILM COATED ORAL
Qty: 21 | Refills: 0 | Status: COMPLETED | COMMUNITY
Start: 2022-07-02

## 2022-11-01 NOTE — HISTORY OF PRESENT ILLNESS
[de-identified] : Pt 1 year s/p left thyroid lobectomy doing well without complaints feels well on no Synthroid all reports reviewed recent sonogram shows no nodules on Right

## 2022-11-01 NOTE — PHYSICAL EXAM
[de-identified] : well healed scar [Midline] : located in midline position [Normal] : orientation to person, place, and time: normal No

## 2022-11-01 NOTE — ASSESSMENT
[FreeTextEntry1] : s/p Thyroid lobectomy\par blood work in office\par sonogram next visit \par f/u 1 year

## 2022-11-08 ENCOUNTER — NON-APPOINTMENT (OUTPATIENT)
Age: 42
End: 2022-11-08

## 2022-11-08 LAB
T3 SERPL-MCNC: 102 NG/DL
T4 FREE SERPL-MCNC: 1.4 NG/DL
THYROGLOB AB SERPL-ACNC: <20 IU/ML
THYROPEROXIDASE AB SERPL IA-ACNC: 52 IU/ML
TSH SERPL-ACNC: 1.67 UIU/ML

## 2022-11-09 ENCOUNTER — NON-APPOINTMENT (OUTPATIENT)
Age: 42
End: 2022-11-09

## 2022-11-14 ENCOUNTER — OUTPATIENT (OUTPATIENT)
Dept: OUTPATIENT SERVICES | Facility: HOSPITAL | Age: 42
LOS: 1 days | End: 2022-11-14
Payer: MEDICAID

## 2022-11-14 ENCOUNTER — APPOINTMENT (OUTPATIENT)
Dept: ULTRASOUND IMAGING | Facility: HOSPITAL | Age: 42
End: 2022-11-14

## 2022-11-14 DIAGNOSIS — N83.209 UNSPECIFIED OVARIAN CYST, UNSPECIFIED SIDE: ICD-10-CM

## 2022-11-14 PROCEDURE — 76830 TRANSVAGINAL US NON-OB: CPT | Mod: 26

## 2022-11-14 PROCEDURE — 76830 TRANSVAGINAL US NON-OB: CPT

## 2022-11-16 ENCOUNTER — APPOINTMENT (OUTPATIENT)
Dept: OBGYN | Facility: CLINIC | Age: 42
End: 2022-11-16

## 2022-11-16 VITALS
TEMPERATURE: 97.9 F | WEIGHT: 121 LBS | HEART RATE: 80 BPM | SYSTOLIC BLOOD PRESSURE: 100 MMHG | HEIGHT: 64 IN | DIASTOLIC BLOOD PRESSURE: 60 MMHG | OXYGEN SATURATION: 98 % | BODY MASS INDEX: 20.66 KG/M2

## 2022-11-16 DIAGNOSIS — D25.1 INTRAMURAL LEIOMYOMA OF UTERUS: ICD-10-CM

## 2022-11-16 DIAGNOSIS — D25.2 INTRAMURAL LEIOMYOMA OF UTERUS: ICD-10-CM

## 2022-11-16 PROCEDURE — 99213 OFFICE O/P EST LOW 20 MIN: CPT

## 2022-11-16 NOTE — PLAN
[FreeTextEntry1] : \par Discussed the case with the patient--she prefers to move forward robotic myomectomy at the end of January at [facility]. Discussed that GYN Onc will not be on back up at , however suspicion for GYN malignancy is low. Patient would still like to move forward\par \par I spent the time noted on the day of this patient encounter preparing for, providing and documenting the above service. I have  counseled and educated the patient on the differential, workup, disease course, and treatment/management plan. Education was provided to the patient during this encounter. All questions and concerns were answered and addressed in detail.\par \par Mila Garcia MD\par

## 2022-11-16 NOTE — HISTORY OF PRESENT ILLNESS
[FreeTextEntry1] : 43 yo with myomatous uterus and pelvic pain presenting today for pre-operative visit for robotic myomectomy.\par \par Pt with recent TVUS showing no change in size of myomas present. Had consultation with GYN Onc, low suspicion for malignancy.\par \par Patient prefers to have surgery at [facility] at end of January rather than at .\par \par We discussed the risks, benefits and alternatives of the procedure including, but not limited to: pain, bleeding, infection, risk of damage to the uterus, uterine perforation, chance of diagnostic laparotomy, risk of damage of structures surrounding the uterus including but not limited to bowel, bladder ureters, nerves, and vessels. The chance of deep vein thromboses was also discussed. The patient expressed understanding of the risks, benefits and alternatives of the procedure and was able to explain them in her own words.\par \par She understands that she should not become pregnant for 18 mo post surgery in an effort to avoid pregnancy complications such as abnormal placentation, miscarriage, fetal demise,  labor, uterine rupture. \par \par She also understands that should she become pregnant, the safest mode of delivery would be via planned csection prior to labor.\par \par

## 2022-11-21 ENCOUNTER — APPOINTMENT (OUTPATIENT)
Dept: ULTRASOUND IMAGING | Facility: HOSPITAL | Age: 42
End: 2022-11-21

## 2023-01-04 ENCOUNTER — APPOINTMENT (OUTPATIENT)
Dept: OBGYN | Facility: CLINIC | Age: 43
End: 2023-01-04

## 2023-04-24 ENCOUNTER — APPOINTMENT (OUTPATIENT)
Dept: OBGYN | Facility: CLINIC | Age: 43
End: 2023-04-24

## 2023-05-01 ENCOUNTER — NON-APPOINTMENT (OUTPATIENT)
Age: 43
End: 2023-05-01

## 2023-05-03 ENCOUNTER — APPOINTMENT (OUTPATIENT)
Dept: OBGYN | Facility: CLINIC | Age: 43
End: 2023-05-03
Payer: MEDICAID

## 2023-05-03 VITALS
WEIGHT: 120 LBS | HEART RATE: 97 BPM | BODY MASS INDEX: 20.49 KG/M2 | TEMPERATURE: 98.2 F | HEIGHT: 64 IN | DIASTOLIC BLOOD PRESSURE: 70 MMHG | SYSTOLIC BLOOD PRESSURE: 110 MMHG | OXYGEN SATURATION: 98 %

## 2023-05-03 LAB
HCG UR QL: NEGATIVE
QUALITY CONTROL: YES

## 2023-05-03 PROCEDURE — 81025 URINE PREGNANCY TEST: CPT

## 2023-05-03 PROCEDURE — 99213 OFFICE O/P EST LOW 20 MIN: CPT

## 2023-05-03 NOTE — HISTORY OF PRESENT ILLNESS
[FreeTextEntry1] : Pt is a 42 year old who presents for spotting for 2 months. Pt verbalizes she is feeling discomfort in her lower pelvic area. Pt denies fever, abdominal bloating, or weight loss. \par \par Pt had pre-operative visit for robotic myomectomy in 11/16/2022 but cancelled the surgery. \par \par

## 2023-05-03 NOTE — PHYSICAL EXAM
[Chaperone Present] : A chaperone was present in the examining room during all aspects of the physical examination [Appropriately responsive] : appropriately responsive [Alert] : alert [No Acute Distress] : no acute distress [Soft] : soft [Non-tender] : non-tender [Non-distended] : non-distended [No HSM] : No HSM [No Lesions] : no lesions [No Mass] : no mass [Oriented x3] : oriented x3 [Labia Majora] : normal [Labia Minora] : normal [Normal] : normal [Mass ___ cm] : a [unfilled] ~Ucm uterine mass was palpated [Uterine Adnexae] : normal [Ovarian Mass (___ Cm)] : mass present

## 2023-05-03 NOTE — PLAN
[FreeTextEntry1] : Vaginal culture collected\par \par labs and TVUS ordered \par \par Surgical consult with Dr. Garcia scheduled for myomectomy

## 2023-05-03 NOTE — DISCUSSION/SUMMARY
[FreeTextEntry1] : I spent the time noted on the day of this patient encounter preparing for, providing and documenting the above service. I have  counseled and educated the patient on the differential, workup, disease course, and treatment/management plan. Education was provided to the patient during this encounter. All questions and concerns were answered and addressed in detail.\par \par Mary Lou Cano NP, FNP-BC

## 2023-05-06 ENCOUNTER — LABORATORY RESULT (OUTPATIENT)
Age: 43
End: 2023-05-06

## 2023-05-08 ENCOUNTER — NON-APPOINTMENT (OUTPATIENT)
Age: 43
End: 2023-05-08

## 2023-05-08 ENCOUNTER — APPOINTMENT (OUTPATIENT)
Dept: ULTRASOUND IMAGING | Facility: CLINIC | Age: 43
End: 2023-05-08

## 2023-05-08 DIAGNOSIS — B37.31 ACUTE CANDIDIASIS OF VULVA AND VAGINA: ICD-10-CM

## 2023-05-08 LAB
A VAGINAE DNA VAG QL NAA+PROBE: NORMAL
BVAB2 DNA VAG QL NAA+PROBE: NORMAL
C KRUSEI DNA VAG QL NAA+PROBE: NEGATIVE
C KRUSEI DNA VAG QL NAA+PROBE: POSITIVE
C TRACH RRNA SPEC QL NAA+PROBE: NEGATIVE
MEGA1 DNA VAG QL NAA+PROBE: NORMAL
N GONORRHOEA RRNA SPEC QL NAA+PROBE: NEGATIVE
T VAGINALIS RRNA SPEC QL NAA+PROBE: NEGATIVE

## 2023-05-10 NOTE — H&P PST ADULT - NEGATIVE BREAST SYMPTOMS
TOTAL KNEE ARTHROPLASTY OP NOTE      OPERATIVE REPORT    Patient: Daniel Rai CSN: [id number] SSN: [id number]    YOB: 1949  Age: 75 y.o. Sex: male      Admit Date: 5/10/2023  Admit Diagnosis: Primary osteoarthritis of right knee [M17.11]    Date of Procedure: 5/10/2023  Preoperative Diagnosis: RIGHT KNEE OSTEOARTHRITIS  Postoperative Diagnosis: same   Procedure: Procedure(s):  RIGHT TOTAL KNEE REPLACEMENT  Surgeon: Gay Remy MD  Assistant(s):  Tala Cooper PA-C  Anesthesia: General   Estimated Blood Loss:<100CC  Specimens: * No specimens in log *   Complications: None  Implants:   Implant Name Type Inv. Item Serial No.  Lot No. LRB No. Used Action   COMPONENT FEM SZ 6 R KNEE CRUCE RET CEMENTLESS BEAD W/ MICHAEL - [id number]  COMPONENT FEM SZ 6 R KNEE CRUCE RET CEMENTLESS BEAD W/ MICHAEL  [employer]WD [id number] Right 1 Implanted   COMPONENT PAT SZ A40 W44MM [id number] MED LAT KNEE - [id number]  COMPONENT PAT SZ A40 W44MM [id number] MED LAT KNEE  [employer]WD [id number] Right 1 Implanted   BASEPLATE TIB SZ 6 [id number] ML77MM KNEE TRITANIUM 4 CRUCFRM - [id number]  BASEPLATE TIB SZ 6 [id number] ML77MM KNEE TRITANIUM 4 CRUCFRM  [employer]Rx[employer]WD [id number] Right 1 Implanted         INDICATIONS: The patient is a 75 y.o., male who has who has been suffering from knee arthritis. He has failed conservative therapy including activity modification, anti-inflammatories and injections. The arthritis is significantly impacting the patient's quality of life. We did discuss the option of total knee arthroplasty with them at length. He understood the risks and benefits including the risks of infection and blood clot, and elected to proceed with knee replacement.     DESCRIPTION OF PROCEDURE: After being informed of the risks and benefits, which include, but are not limited to, bleeding, infection, neurovascular damage, wound complications, pain and stiffness in the no breast tenderness L/no breast tenderness R/no nipple discharge L/no nipple discharge R

## 2023-05-16 ENCOUNTER — NON-APPOINTMENT (OUTPATIENT)
Age: 43
End: 2023-05-16

## 2023-06-05 ENCOUNTER — OUTPATIENT (OUTPATIENT)
Dept: OUTPATIENT SERVICES | Facility: HOSPITAL | Age: 43
LOS: 1 days | End: 2023-06-05
Payer: MEDICAID

## 2023-06-05 ENCOUNTER — APPOINTMENT (OUTPATIENT)
Dept: ULTRASOUND IMAGING | Facility: HOSPITAL | Age: 43
End: 2023-06-05
Payer: MEDICAID

## 2023-06-05 DIAGNOSIS — N93.9 ABNORMAL UTERINE AND VAGINAL BLEEDING, UNSPECIFIED: ICD-10-CM

## 2023-06-05 PROCEDURE — 76830 TRANSVAGINAL US NON-OB: CPT

## 2023-06-05 PROCEDURE — 76830 TRANSVAGINAL US NON-OB: CPT | Mod: 26

## 2023-06-06 ENCOUNTER — NON-APPOINTMENT (OUTPATIENT)
Age: 43
End: 2023-06-06

## 2023-06-28 ENCOUNTER — APPOINTMENT (OUTPATIENT)
Dept: OBGYN | Facility: CLINIC | Age: 43
End: 2023-06-28
Payer: MEDICAID

## 2023-06-28 VITALS
TEMPERATURE: 98 F | WEIGHT: 118 LBS | BODY MASS INDEX: 20.14 KG/M2 | DIASTOLIC BLOOD PRESSURE: 70 MMHG | HEART RATE: 85 BPM | HEIGHT: 64 IN | OXYGEN SATURATION: 98 % | SYSTOLIC BLOOD PRESSURE: 110 MMHG

## 2023-06-28 DIAGNOSIS — N76.0 ACUTE VAGINITIS: ICD-10-CM

## 2023-06-28 LAB
HCG UR QL: NEGATIVE
QUALITY CONTROL: YES

## 2023-06-28 PROCEDURE — 99396 PREV VISIT EST AGE 40-64: CPT | Mod: 25

## 2023-06-28 PROCEDURE — 99214 OFFICE O/P EST MOD 30 MIN: CPT | Mod: 25

## 2023-06-28 PROCEDURE — 58100 BIOPSY OF UTERUS LINING: CPT

## 2023-06-28 NOTE — HISTORY OF PRESENT ILLNESS
[FreeTextEntry1] : Pt is a 42 year old presents today for well woman exam. \par LMP: 6/4/2023\par \par \par Mammo: 7/2022 \par \par \par Reviewed TVUS--myomas slightly increased in size EE 7mm\par Ovaries WNL\par \par Patient still considering surgery--to return for surgical consult

## 2023-06-28 NOTE — PROCEDURE
[Endometrial Biopsy] : Endometrial biopsy [Time out performed] : Pre-procedure time out performed.  Patient's name, date of birth and procedure confirmed. [Consent Obtained] : Consent obtained [Risks] : risks [Benefits] : benefits [Alternatives] : alternatives [Patient] : patient [Infection] : infection [Bleeding] : bleeding [Allergic Reaction] : allergic reaction [Uterine Perforation] : uterine perforation [Pain] : pain [Negative] : negative pregnancy test [Betadine] : Betadine [None] : none [Tenaculum] : Tenaculum [Anteverted] : anteverted [Specimen Collected] : collected [Sent to Pathology] : placed in buffered formalin and sent for pathology [Tolerated Well] : Patient tolerated the procedure well [No Complications] : No complications

## 2023-06-29 ENCOUNTER — NON-APPOINTMENT (OUTPATIENT)
Age: 43
End: 2023-06-29

## 2023-06-29 LAB
C TRACH RRNA SPEC QL NAA+PROBE: NOT DETECTED
N GONORRHOEA RRNA SPEC QL NAA+PROBE: NOT DETECTED
SOURCE AMPLIFICATION: NORMAL

## 2023-07-03 LAB
CANDIDA VAG CYTO: DETECTED
CYTOLOGY CVX/VAG DOC THIN PREP: ABNORMAL
G VAGINALIS+PREV SP MTYP VAG QL MICRO: NOT DETECTED
HPV HIGH+LOW RISK DNA PNL CVX: NOT DETECTED
T VAGINALIS VAG QL WET PREP: NOT DETECTED

## 2023-07-03 RX ORDER — FLUCONAZOLE 150 MG/1
150 TABLET ORAL
Qty: 2 | Refills: 1 | Status: ACTIVE | COMMUNITY
Start: 2023-05-08 | End: 1900-01-01

## 2023-07-13 ENCOUNTER — APPOINTMENT (OUTPATIENT)
Dept: OBGYN | Facility: CLINIC | Age: 43
End: 2023-07-13
Payer: MEDICAID

## 2023-07-13 VITALS
WEIGHT: 118 LBS | DIASTOLIC BLOOD PRESSURE: 70 MMHG | HEART RATE: 91 BPM | BODY MASS INDEX: 20.14 KG/M2 | HEIGHT: 64 IN | OXYGEN SATURATION: 98 % | SYSTOLIC BLOOD PRESSURE: 110 MMHG | TEMPERATURE: 97.6 F

## 2023-07-13 DIAGNOSIS — Z86.018 PERSONAL HISTORY OF OTHER BENIGN NEOPLASM: ICD-10-CM

## 2023-07-13 LAB
CORE LAB BIOPSY: NORMAL
HCG UR QL: NEGATIVE
QUALITY CONTROL: YES

## 2023-07-13 PROCEDURE — 99214 OFFICE O/P EST MOD 30 MIN: CPT | Mod: 57

## 2023-07-13 NOTE — PLAN
[FreeTextEntry1] : \par Surgery for 22\par \par We discussed the risks, benefits and alternatives of the procedure including, but not limited to: pain, bleeding, infection, risk of damage to the uterus, uterine perforation, chance of diagnostic laparotomy, risk of damage of structures surrounding the uterus including but not limited to bowel, bladder ureters, nerves, and vessels. The chance of deep vein thromboses was also discussed. The patient expressed understanding of the risks, benefits and alternatives of the procedure and was able to explain them in her own words.\par \par She understands that she should not become pregnant for 6-12 mo post surgery in an effort to avoid pregnancy complications such as abnormal placentation, miscarriage, fetal demise,  labor, uterine rupture. \par \par She also understands that should she become pregnant, the safest mode of delivery would be via planned csection prior to labor.\par

## 2023-07-31 ENCOUNTER — RESULT REVIEW (OUTPATIENT)
Age: 43
End: 2023-07-31

## 2023-07-31 ENCOUNTER — APPOINTMENT (OUTPATIENT)
Dept: ULTRASOUND IMAGING | Facility: HOSPITAL | Age: 43
End: 2023-07-31
Payer: MEDICAID

## 2023-07-31 ENCOUNTER — NON-APPOINTMENT (OUTPATIENT)
Age: 43
End: 2023-07-31

## 2023-07-31 ENCOUNTER — APPOINTMENT (OUTPATIENT)
Dept: MAMMOGRAPHY | Facility: HOSPITAL | Age: 43
End: 2023-07-31
Payer: MEDICAID

## 2023-07-31 ENCOUNTER — OUTPATIENT (OUTPATIENT)
Dept: OUTPATIENT SERVICES | Facility: HOSPITAL | Age: 43
LOS: 1 days | End: 2023-07-31
Payer: MEDICAID

## 2023-07-31 DIAGNOSIS — Z00.8 ENCOUNTER FOR OTHER GENERAL EXAMINATION: ICD-10-CM

## 2023-07-31 PROCEDURE — 76641 ULTRASOUND BREAST COMPLETE: CPT | Mod: 26,50

## 2023-07-31 PROCEDURE — 76641 ULTRASOUND BREAST COMPLETE: CPT

## 2023-07-31 PROCEDURE — 77063 BREAST TOMOSYNTHESIS BI: CPT | Mod: 26

## 2023-07-31 PROCEDURE — 77067 SCR MAMMO BI INCL CAD: CPT | Mod: 26

## 2023-07-31 PROCEDURE — 77063 BREAST TOMOSYNTHESIS BI: CPT

## 2023-07-31 PROCEDURE — 77067 SCR MAMMO BI INCL CAD: CPT

## 2023-08-01 ENCOUNTER — NON-APPOINTMENT (OUTPATIENT)
Age: 43
End: 2023-08-01

## 2023-08-01 ENCOUNTER — OUTPATIENT (OUTPATIENT)
Dept: OUTPATIENT SERVICES | Facility: HOSPITAL | Age: 43
LOS: 1 days | End: 2023-08-01
Payer: MEDICAID

## 2023-08-01 VITALS
WEIGHT: 122.14 LBS | DIASTOLIC BLOOD PRESSURE: 70 MMHG | HEART RATE: 74 BPM | OXYGEN SATURATION: 100 % | SYSTOLIC BLOOD PRESSURE: 105 MMHG | HEIGHT: 62.5 IN | TEMPERATURE: 98 F | RESPIRATION RATE: 18 BRPM

## 2023-08-01 DIAGNOSIS — Z01.818 ENCOUNTER FOR OTHER PREPROCEDURAL EXAMINATION: ICD-10-CM

## 2023-08-01 DIAGNOSIS — D25.9 LEIOMYOMA OF UTERUS, UNSPECIFIED: ICD-10-CM

## 2023-08-01 DIAGNOSIS — E89.0 POSTPROCEDURAL HYPOTHYROIDISM: Chronic | ICD-10-CM

## 2023-08-01 LAB
BLD GP AB SCN SERPL QL: SIGNIFICANT CHANGE UP
HCT VFR BLD CALC: 40.4 % — SIGNIFICANT CHANGE UP (ref 34.5–45)
HGB BLD-MCNC: 14 G/DL — SIGNIFICANT CHANGE UP (ref 11.5–15.5)
MCHC RBC-ENTMCNC: 31.3 PG — SIGNIFICANT CHANGE UP (ref 27–34)
MCHC RBC-ENTMCNC: 34.7 GM/DL — SIGNIFICANT CHANGE UP (ref 32–36)
MCV RBC AUTO: 90.2 FL — SIGNIFICANT CHANGE UP (ref 80–100)
NRBC # BLD: 0 /100 WBCS — SIGNIFICANT CHANGE UP (ref 0–0)
PLATELET # BLD AUTO: 224 K/UL — SIGNIFICANT CHANGE UP (ref 150–400)
RBC # BLD: 4.48 M/UL — SIGNIFICANT CHANGE UP (ref 3.8–5.2)
RBC # FLD: 11.6 % — SIGNIFICANT CHANGE UP (ref 10.3–14.5)
WBC # BLD: 4.49 K/UL — SIGNIFICANT CHANGE UP (ref 3.8–10.5)
WBC # FLD AUTO: 4.49 K/UL — SIGNIFICANT CHANGE UP (ref 3.8–10.5)

## 2023-08-01 PROCEDURE — 86900 BLOOD TYPING SEROLOGIC ABO: CPT

## 2023-08-01 PROCEDURE — 87086 URINE CULTURE/COLONY COUNT: CPT

## 2023-08-01 PROCEDURE — 87077 CULTURE AEROBIC IDENTIFY: CPT

## 2023-08-01 PROCEDURE — G0463: CPT

## 2023-08-01 PROCEDURE — 86850 RBC ANTIBODY SCREEN: CPT

## 2023-08-01 PROCEDURE — 36415 COLL VENOUS BLD VENIPUNCTURE: CPT

## 2023-08-01 PROCEDURE — 86901 BLOOD TYPING SEROLOGIC RH(D): CPT

## 2023-08-01 PROCEDURE — 85027 COMPLETE CBC AUTOMATED: CPT

## 2023-08-01 PROCEDURE — 87186 SC STD MICRODIL/AGAR DIL: CPT

## 2023-08-01 NOTE — H&P PST ADULT - HISTORY OF PRESENT ILLNESS
41 y/o female with no significant PMH presents for PST.  C/o abdominal pain resulting in evaluation from GYN and recommended for surgery.   Otherwise feeling well at PST.  Scheduled for robotic myomectomy with Dr Garcia on 08/07/2023.   41 y/o female with no significant PMH presents for PST.  C/o abdominal pain with menstrual cycle resulting in evaluation from GYN and recommended for surgery.   Otherwise feeling well at [facility].  Scheduled for robotic myomectomy with Dr Garcia on 08/07/2023.

## 2023-08-01 NOTE — H&P PST ADULT - NSANTHOSAYNRD_GEN_A_CORE
neck 13 inches/No. BARBARA screening performed.  STOP BANG Legend: 0-2 = LOW Risk; 3-4 = INTERMEDIATE Risk; 5-8 = HIGH Risk

## 2023-08-01 NOTE — H&P PST ADULT - PROBLEM SELECTOR PLAN 1
Scheduled for robotic myomectomy with Dr Garcia on 08/07/2023.  Pre op instructions given and patient verbalized understanding.  CBC, T&S and Urine culture pending.  NPO after midnight night before procedure.  Chlorhexidene wash given with instructions  UCG pre op

## 2023-08-04 DIAGNOSIS — N39.0 URINARY TRACT INFECTION, SITE NOT SPECIFIED: ICD-10-CM

## 2023-08-04 DIAGNOSIS — B96.89 URINARY TRACT INFECTION, SITE NOT SPECIFIED: ICD-10-CM

## 2023-08-04 RX ORDER — LEVOFLOXACIN 750 MG/1
750 TABLET, FILM COATED ORAL DAILY
Qty: 5 | Refills: 0 | Status: ACTIVE | COMMUNITY
Start: 2023-08-04 | End: 1900-01-01

## 2023-08-07 ENCOUNTER — INPATIENT (INPATIENT)
Facility: HOSPITAL | Age: 43
LOS: 2 days | Discharge: HOME | DRG: 743 | End: 2023-08-10
Attending: OBSTETRICS & GYNECOLOGY | Admitting: OBSTETRICS & GYNECOLOGY
Payer: MEDICAID

## 2023-08-07 ENCOUNTER — RESULT REVIEW (OUTPATIENT)
Age: 43
End: 2023-08-07

## 2023-08-07 ENCOUNTER — APPOINTMENT (OUTPATIENT)
Dept: OBGYN | Facility: HOSPITAL | Age: 43
End: 2023-08-07

## 2023-08-07 VITALS — WEIGHT: 122.14 LBS | HEIGHT: 62.5 IN

## 2023-08-07 DIAGNOSIS — D25.9 LEIOMYOMA OF UTERUS, UNSPECIFIED: ICD-10-CM

## 2023-08-07 DIAGNOSIS — E89.0 POSTPROCEDURAL HYPOTHYROIDISM: Chronic | ICD-10-CM

## 2023-08-07 LAB
HCT VFR BLD CALC: 35.6 % — SIGNIFICANT CHANGE UP (ref 34.5–45)
HGB BLD-MCNC: 12.5 G/DL — SIGNIFICANT CHANGE UP (ref 11.5–15.5)
MCHC RBC-ENTMCNC: 31.3 PG — SIGNIFICANT CHANGE UP (ref 27–34)
MCHC RBC-ENTMCNC: 35.1 GM/DL — SIGNIFICANT CHANGE UP (ref 32–36)
MCV RBC AUTO: 89 FL — SIGNIFICANT CHANGE UP (ref 80–100)
NRBC # BLD: 0 /100 WBCS — SIGNIFICANT CHANGE UP (ref 0–0)
PLATELET # BLD AUTO: 174 K/UL — SIGNIFICANT CHANGE UP (ref 150–400)
RBC # BLD: 4 M/UL — SIGNIFICANT CHANGE UP (ref 3.8–5.2)
RBC # FLD: 11.9 % — SIGNIFICANT CHANGE UP (ref 10.3–14.5)
WBC # BLD: 12.72 K/UL — HIGH (ref 3.8–10.5)
WBC # FLD AUTO: 12.72 K/UL — HIGH (ref 3.8–10.5)

## 2023-08-07 PROCEDURE — 58146 MYOMECTOMY ABDOM COMPLEX: CPT

## 2023-08-07 PROCEDURE — 88305 TISSUE EXAM BY PATHOLOGIST: CPT | Mod: 26

## 2023-08-07 DEVICE — ARISTA 3GR: Type: IMPLANTABLE DEVICE | Status: FUNCTIONAL

## 2023-08-07 DEVICE — INTERCEED 3 X 4": Type: IMPLANTABLE DEVICE | Status: FUNCTIONAL

## 2023-08-07 RX ORDER — SODIUM CHLORIDE 9 MG/ML
1000 INJECTION, SOLUTION INTRAVENOUS
Refills: 0 | Status: DISCONTINUED | OUTPATIENT
Start: 2023-08-07 | End: 2023-08-07

## 2023-08-07 RX ORDER — MELATONIN 5 MG
3 TABLET,CHEWABLE ORAL
Refills: 0 | DISCHARGE

## 2023-08-07 RX ORDER — ONDANSETRON 8 MG/1
4 TABLET, FILM COATED ORAL EVERY 6 HOURS
Refills: 0 | Status: DISCONTINUED | OUTPATIENT
Start: 2023-08-07 | End: 2023-08-10

## 2023-08-07 RX ORDER — IBUPROFEN 200 MG
600 TABLET ORAL
Refills: 0 | Status: DISCONTINUED | OUTPATIENT
Start: 2023-08-07 | End: 2023-08-10

## 2023-08-07 RX ORDER — IBUPROFEN 200 MG
1 TABLET ORAL
Refills: 0 | DISCHARGE

## 2023-08-07 RX ORDER — ERGOCALCIFEROL 1.25 MG/1
1 CAPSULE ORAL
Qty: 0 | Refills: 0 | DISCHARGE

## 2023-08-07 RX ORDER — HYDROMORPHONE HYDROCHLORIDE 2 MG/ML
0.5 INJECTION INTRAMUSCULAR; INTRAVENOUS; SUBCUTANEOUS
Refills: 0 | Status: DISCONTINUED | OUTPATIENT
Start: 2023-08-07 | End: 2023-08-07

## 2023-08-07 RX ORDER — HYDROMORPHONE HYDROCHLORIDE 2 MG/ML
1 INJECTION INTRAMUSCULAR; INTRAVENOUS; SUBCUTANEOUS ONCE
Refills: 0 | Status: DISCONTINUED | OUTPATIENT
Start: 2023-08-07 | End: 2023-08-07

## 2023-08-07 RX ORDER — HYDROMORPHONE HYDROCHLORIDE 2 MG/ML
0.5 INJECTION INTRAMUSCULAR; INTRAVENOUS; SUBCUTANEOUS EVERY 6 HOURS
Refills: 0 | Status: DISCONTINUED | OUTPATIENT
Start: 2023-08-07 | End: 2023-08-08

## 2023-08-07 RX ORDER — ACETAMINOPHEN 500 MG
975 TABLET ORAL EVERY 6 HOURS
Refills: 0 | Status: DISCONTINUED | OUTPATIENT
Start: 2023-08-07 | End: 2023-08-10

## 2023-08-07 RX ORDER — ONDANSETRON 8 MG/1
4 TABLET, FILM COATED ORAL ONCE
Refills: 0 | Status: COMPLETED | OUTPATIENT
Start: 2023-08-07 | End: 2023-08-07

## 2023-08-07 RX ORDER — HYDROMORPHONE HYDROCHLORIDE 2 MG/ML
0.25 INJECTION INTRAMUSCULAR; INTRAVENOUS; SUBCUTANEOUS EVERY 6 HOURS
Refills: 0 | Status: DISCONTINUED | OUTPATIENT
Start: 2023-08-07 | End: 2023-08-08

## 2023-08-07 RX ORDER — SODIUM CHLORIDE 9 MG/ML
1000 INJECTION, SOLUTION INTRAVENOUS
Refills: 0 | Status: DISCONTINUED | OUTPATIENT
Start: 2023-08-07 | End: 2023-08-08

## 2023-08-07 RX ORDER — IBUPROFEN 200 MG
600 TABLET ORAL EVERY 6 HOURS
Refills: 0 | Status: DISCONTINUED | OUTPATIENT
Start: 2023-08-07 | End: 2023-08-07

## 2023-08-07 RX ADMIN — HYDROMORPHONE HYDROCHLORIDE 0.5 MILLIGRAM(S): 2 INJECTION INTRAMUSCULAR; INTRAVENOUS; SUBCUTANEOUS at 16:51

## 2023-08-07 RX ADMIN — Medication 600 MILLIGRAM(S): at 21:39

## 2023-08-07 RX ADMIN — SODIUM CHLORIDE 50 MILLILITER(S): 9 INJECTION, SOLUTION INTRAVENOUS at 06:25

## 2023-08-07 RX ADMIN — Medication 975 MILLIGRAM(S): at 18:44

## 2023-08-07 RX ADMIN — Medication 600 MILLIGRAM(S): at 21:54

## 2023-08-07 RX ADMIN — HYDROMORPHONE HYDROCHLORIDE 0.5 MILLIGRAM(S): 2 INJECTION INTRAMUSCULAR; INTRAVENOUS; SUBCUTANEOUS at 12:42

## 2023-08-07 RX ADMIN — HYDROMORPHONE HYDROCHLORIDE 0.5 MILLIGRAM(S): 2 INJECTION INTRAMUSCULAR; INTRAVENOUS; SUBCUTANEOUS at 12:22

## 2023-08-07 RX ADMIN — SODIUM CHLORIDE 75 MILLILITER(S): 9 INJECTION, SOLUTION INTRAVENOUS at 17:50

## 2023-08-07 RX ADMIN — HYDROMORPHONE HYDROCHLORIDE 0.5 MILLIGRAM(S): 2 INJECTION INTRAMUSCULAR; INTRAVENOUS; SUBCUTANEOUS at 17:52

## 2023-08-07 RX ADMIN — HYDROMORPHONE HYDROCHLORIDE 1 MILLIGRAM(S): 2 INJECTION INTRAMUSCULAR; INTRAVENOUS; SUBCUTANEOUS at 10:52

## 2023-08-07 RX ADMIN — ONDANSETRON 4 MILLIGRAM(S): 8 TABLET, FILM COATED ORAL at 10:54

## 2023-08-07 RX ADMIN — HYDROMORPHONE HYDROCHLORIDE 1 MILLIGRAM(S): 2 INJECTION INTRAMUSCULAR; INTRAVENOUS; SUBCUTANEOUS at 11:02

## 2023-08-07 RX ADMIN — Medication 975 MILLIGRAM(S): at 17:48

## 2023-08-07 NOTE — PRE-OP CHECKLIST - HEART RATE (BEATS/MIN)
o/n: kayexalate given for potassium of 5, WBC 16.5, repeat potassium 4.6   12/15: HSQ started - cleared by Neuro, PT will re-assess on Sat    amLODIPine   Tablet 5  aspirin  chewable 81  clopidogrel Tablet 75  labetalol Injectable 10 PRN      Allergies    No Known Allergies    Intolerances        Vital Signs Last 24 Hrs  T(C): 35.9 (15 Dec 2017 09:27), Max: 36.4 (14 Dec 2017 16:38)  T(F): 96.7 (15 Dec 2017 09:27), Max: 97.5 (14 Dec 2017 16:38)  HR: 64 (15 Dec 2017 08:44) (58 - 84)  BP: 138/64 (15 Dec 2017 08:44) (116/58 - 187/84)  BP(mean): 116 (14 Dec 2017 17:00) (89 - 116)  RR: 16 (15 Dec 2017 08:44) (16 - 28)  SpO2: 97% (15 Dec 2017 08:44) (96% - 100%)  I&O's Summary    14 Dec 2017 07:01  -  15 Dec 2017 07:00  --------------------------------------------------------  IN: 150 mL / OUT: 600 mL / NET: -450 mL    15 Dec 2017 07:01  -  15 Dec 2017 10:21  --------------------------------------------------------  IN: 120 mL / OUT: 0 mL / NET: 120 mL        Physical Exam:  General: AAox3, NAD  Neuro: grossly intact, moves all extremities, strength almost equal bilat in UE and LE R<L, no sensory deficits, no droop/slurred speech  Pulmonary:  Cardiovascular:  Abdominal:  Extremities: warm, palp pulses, no edema, calfs non tender    LABS:                        12.4   14.4  )-----------( 345      ( 15 Dec 2017 07:01 )             37.8     12-15    139  |  104  |  23  ----------------------------<  108<H>  5.0   |  24  |  0.67    Ca    9.7      15 Dec 2017 07:01  Phos  3.3     12-15  Mg     2.0     12-15      70 yo F with HTN, HLD, hypothyroidism with h/o b/l carotid stenosis s/p R CEA 2014, L CEA 2016 with L carotid 90% restenosis s/p L ICA stent with ischemic stroke, R sided weakness post op, now improving and almost back to normal    Pain control  Regular diet   ISS  Plavix 75, ASA81  DVT ppx with HSQ - cleared with neurology  PT- acute rehab; OT eval  AM labs o/n: kayexalate given for potassium of 5, WBC 16.5, repeat potassium 4.6   12/15: HSQ started - cleared by Neuro, PT will re-assess on Sat    Pt seen and examined at bedside. No acute complaints.     amLODIPine   Tablet 5  aspirin  chewable 81  clopidogrel Tablet 75  labetalol Injectable 10 PRN      Allergies    No Known Allergies    Intolerances        Vital Signs Last 24 Hrs  T(C): 35.9 (15 Dec 2017 09:27), Max: 36.4 (14 Dec 2017 16:38)  T(F): 96.7 (15 Dec 2017 09:27), Max: 97.5 (14 Dec 2017 16:38)  HR: 64 (15 Dec 2017 08:44) (58 - 84)  BP: 138/64 (15 Dec 2017 08:44) (116/58 - 187/84)  BP(mean): 116 (14 Dec 2017 17:00) (89 - 116)  RR: 16 (15 Dec 2017 08:44) (16 - 28)  SpO2: 97% (15 Dec 2017 08:44) (96% - 100%)  I&O's Summary    14 Dec 2017 07:01  -  15 Dec 2017 07:00  --------------------------------------------------------  IN: 150 mL / OUT: 600 mL / NET: -450 mL    15 Dec 2017 07:01  -  15 Dec 2017 10:21  --------------------------------------------------------  IN: 120 mL / OUT: 0 mL / NET: 120 mL        Physical Exam:  General: AAox3, NAD  Neuro: grossly intact, moves all extremities, strength almost equal bilat in UE and LE R<L, no sensory deficits, no droop/slurred speech. Unable to shrug completely on R side.   Pulmonary: normal work of breathing on RA  Cardiovascular: RRR  Extremities: warm, palp pulses, no edema, calfs non tender    LABS:                        12.4   14.4  )-----------( 345      ( 15 Dec 2017 07:01 )             37.8     12-15    139  |  104  |  23  ----------------------------<  108<H>  5.0   |  24  |  0.67    Ca    9.7      15 Dec 2017 07:01  Phos  3.3     12-15  Mg     2.0     12-15      70 yo F with HTN, HLD, hypothyroidism with h/o b/l carotid stenosis s/p R CEA 2014, L CEA 2016 with L carotid 90% restenosis s/p L ICA stent with ischemic stroke, R sided weakness post op, now improving and almost back to normal    Pain control  Regular diet   ISS  Plavix 75, ASA81  DVT ppx with HSQ - cleared with neurology  PT- acute rehab; OT bulmaroal  AM labs 80

## 2023-08-07 NOTE — PROGRESS NOTE ADULT - SUBJECTIVE AND OBJECTIVE BOX
F/U Note:    42y Female admitted POD # 0 from Abdominal myomectomy for uterine fibroids         Vital Signs Last 24 Hrs  T(C): 36.9 (07 Aug 2023 16:51), Max: 37 (07 Aug 2023 05:13)  T(F): 98.5 (07 Aug 2023 16:51), Max: 98.6 (07 Aug 2023 05:13)  HR: 74 (07 Aug 2023 16:51) (74 - 96)  BP: 123/80 (07 Aug 2023 16:51) (101/68 - 128/83)  BP(mean): --  RR: 15 (07 Aug 2023 16:51) (12 - 20)  SpO2: 97% (07 Aug 2023 16:51) (96% - 99%)    Parameters below as of 07 Aug 2023 16:25  Patient On (Oxygen Delivery Method): room air                                12.5   12.72 )-----------( 174      ( 07 Aug 2023 15:55 )             35.6                   NEURO: no headaches, blurry vision, tremors, depression, anxity  CV: no chest pain, palpitations, murmurs, orthopnea  Resp: no shortness of breath, cough, wheeze, sputum production  GI: no stomach pain,nausea, vomitting, flatulence, hematemesis, hematochezia  PV: no swelling of extremities, no hair loss, no coolness to extremities  ENDO: no polydypsia, polyphagia, polyuria, weight loss, night sweats         NEURO: awake and alert  CV: (+) S1/S2, rrr, no mrg  RESP: CTA b/l  GI: soft, non tender

## 2023-08-07 NOTE — PRE-OP CHECKLIST - IV STARTED
VS stable.  Instructed on pelvic rest and to take tylenol as needed for pain. Amb out of ER in stable condition.  Gait steady   yes

## 2023-08-07 NOTE — PATIENT PROFILE ADULT - OVER THE PAST TWO WEEKS HAVE YOU FELT DOWN, DEPRESSED OR HOPELESS?
11/2/2021 Patient: Mason Yang YOB: 1949 Date of Visit: 11/2/2021 Nonah Soulier, MD 
[address] [facility] [address] [address] [facility] [address] Dear Nonah Soulier, MD, Thank you for referring Mr. Renee Ayers to [address] [place] [address] for evaluation. My notes for this consultation are attached. If you have questions, please do not hesitate to call me. I look forward to following your patient along with you. Sincerely, Rekha Diaz MD 
 
 no

## 2023-08-08 DIAGNOSIS — D25.9 LEIOMYOMA OF UTERUS, UNSPECIFIED: ICD-10-CM

## 2023-08-08 LAB
ANION GAP SERPL CALC-SCNC: 7 MMOL/L — SIGNIFICANT CHANGE UP (ref 5–17)
BUN SERPL-MCNC: 13 MG/DL — SIGNIFICANT CHANGE UP (ref 7–23)
CALCIUM SERPL-MCNC: 7.4 MG/DL — LOW (ref 8.4–10.5)
CHLORIDE SERPL-SCNC: 102 MMOL/L — SIGNIFICANT CHANGE UP (ref 96–108)
CO2 SERPL-SCNC: 28 MMOL/L — SIGNIFICANT CHANGE UP (ref 22–31)
CREAT SERPL-MCNC: 0.72 MG/DL — SIGNIFICANT CHANGE UP (ref 0.5–1.3)
EGFR: 106 ML/MIN/1.73M2 — SIGNIFICANT CHANGE UP
GLUCOSE SERPL-MCNC: 119 MG/DL — HIGH (ref 70–99)
HCT VFR BLD CALC: 33.3 % — LOW (ref 34.5–45)
HGB BLD-MCNC: 11.4 G/DL — LOW (ref 11.5–15.5)
MCHC RBC-ENTMCNC: 30.9 PG — SIGNIFICANT CHANGE UP (ref 27–34)
MCHC RBC-ENTMCNC: 34.2 GM/DL — SIGNIFICANT CHANGE UP (ref 32–36)
MCV RBC AUTO: 90.2 FL — SIGNIFICANT CHANGE UP (ref 80–100)
NRBC # BLD: 0 /100 WBCS — SIGNIFICANT CHANGE UP (ref 0–0)
PLATELET # BLD AUTO: 168 K/UL — SIGNIFICANT CHANGE UP (ref 150–400)
POTASSIUM SERPL-MCNC: 3.5 MMOL/L — SIGNIFICANT CHANGE UP (ref 3.5–5.3)
POTASSIUM SERPL-SCNC: 3.5 MMOL/L — SIGNIFICANT CHANGE UP (ref 3.5–5.3)
RBC # BLD: 3.69 M/UL — LOW (ref 3.8–5.2)
RBC # FLD: 11.9 % — SIGNIFICANT CHANGE UP (ref 10.3–14.5)
SODIUM SERPL-SCNC: 137 MMOL/L — SIGNIFICANT CHANGE UP (ref 135–145)
WBC # BLD: 7.84 K/UL — SIGNIFICANT CHANGE UP (ref 3.8–10.5)
WBC # FLD AUTO: 7.84 K/UL — SIGNIFICANT CHANGE UP (ref 3.8–10.5)

## 2023-08-08 PROCEDURE — 99024 POSTOP FOLLOW-UP VISIT: CPT

## 2023-08-08 RX ORDER — OXYCODONE HYDROCHLORIDE 5 MG/1
5 TABLET ORAL EVERY 4 HOURS
Refills: 0 | Status: DISCONTINUED | OUTPATIENT
Start: 2023-08-08 | End: 2023-08-10

## 2023-08-08 RX ORDER — HEPARIN SODIUM 5000 [USP'U]/ML
5000 INJECTION INTRAVENOUS; SUBCUTANEOUS EVERY 12 HOURS
Refills: 0 | Status: DISCONTINUED | OUTPATIENT
Start: 2023-08-08 | End: 2023-08-10

## 2023-08-08 RX ORDER — OXYCODONE HYDROCHLORIDE 5 MG/1
2.5 TABLET ORAL EVERY 4 HOURS
Refills: 0 | Status: DISCONTINUED | OUTPATIENT
Start: 2023-08-08 | End: 2023-08-10

## 2023-08-08 RX ADMIN — HYDROMORPHONE HYDROCHLORIDE 0.25 MILLIGRAM(S): 2 INJECTION INTRAMUSCULAR; INTRAVENOUS; SUBCUTANEOUS at 03:30

## 2023-08-08 RX ADMIN — Medication 600 MILLIGRAM(S): at 03:38

## 2023-08-08 RX ADMIN — Medication 975 MILLIGRAM(S): at 01:15

## 2023-08-08 RX ADMIN — Medication 975 MILLIGRAM(S): at 05:36

## 2023-08-08 RX ADMIN — Medication 600 MILLIGRAM(S): at 11:48

## 2023-08-08 RX ADMIN — SODIUM CHLORIDE 75 MILLILITER(S): 9 INJECTION, SOLUTION INTRAVENOUS at 07:09

## 2023-08-08 RX ADMIN — Medication 975 MILLIGRAM(S): at 23:57

## 2023-08-08 RX ADMIN — Medication 600 MILLIGRAM(S): at 09:07

## 2023-08-08 RX ADMIN — Medication 600 MILLIGRAM(S): at 22:25

## 2023-08-08 RX ADMIN — HYDROMORPHONE HYDROCHLORIDE 0.5 MILLIGRAM(S): 2 INJECTION INTRAMUSCULAR; INTRAVENOUS; SUBCUTANEOUS at 05:07

## 2023-08-08 RX ADMIN — ONDANSETRON 4 MILLIGRAM(S): 8 TABLET, FILM COATED ORAL at 09:09

## 2023-08-08 RX ADMIN — Medication 600 MILLIGRAM(S): at 18:20

## 2023-08-08 RX ADMIN — Medication 975 MILLIGRAM(S): at 00:45

## 2023-08-08 RX ADMIN — Medication 975 MILLIGRAM(S): at 17:39

## 2023-08-08 RX ADMIN — OXYCODONE HYDROCHLORIDE 5 MILLIGRAM(S): 5 TABLET ORAL at 13:24

## 2023-08-08 RX ADMIN — OXYCODONE HYDROCHLORIDE 5 MILLIGRAM(S): 5 TABLET ORAL at 17:39

## 2023-08-08 RX ADMIN — HYDROMORPHONE HYDROCHLORIDE 0.25 MILLIGRAM(S): 2 INJECTION INTRAMUSCULAR; INTRAVENOUS; SUBCUTANEOUS at 03:45

## 2023-08-08 RX ADMIN — Medication 975 MILLIGRAM(S): at 11:46

## 2023-08-08 RX ADMIN — Medication 975 MILLIGRAM(S): at 05:06

## 2023-08-08 RX ADMIN — HEPARIN SODIUM 5000 UNIT(S): 5000 INJECTION INTRAVENOUS; SUBCUTANEOUS at 17:40

## 2023-08-08 RX ADMIN — Medication 600 MILLIGRAM(S): at 16:29

## 2023-08-08 RX ADMIN — Medication 600 MILLIGRAM(S): at 03:08

## 2023-08-08 RX ADMIN — OXYCODONE HYDROCHLORIDE 5 MILLIGRAM(S): 5 TABLET ORAL at 18:20

## 2023-08-08 RX ADMIN — HYDROMORPHONE HYDROCHLORIDE 0.5 MILLIGRAM(S): 2 INJECTION INTRAMUSCULAR; INTRAVENOUS; SUBCUTANEOUS at 05:36

## 2023-08-08 RX ADMIN — Medication 600 MILLIGRAM(S): at 21:55

## 2023-08-08 RX ADMIN — Medication 975 MILLIGRAM(S): at 12:22

## 2023-08-08 RX ADMIN — Medication 975 MILLIGRAM(S): at 18:20

## 2023-08-08 NOTE — PROGRESS NOTE ADULT - NSPROGADDITIONALINFOA_GEN_ALL_CORE
I have personally seen and examined the patient and agree with the above stated assessment and plan.    Patient has voided, continue post-operative care as above.    DANI Palmer Attending

## 2023-08-08 NOTE — PROGRESS NOTE ADULT - SUBJECTIVE AND OBJECTIVE BOX
POD 1 Open Abdominal myomectomy, this morning pt admits to pain on abdomen upon movement, occasional Nausea relieved with drinking fluids. Denies bowel function, vomiting, or sob      PAST MEDICAL & SURGICAL HISTORY:  Fibroid uterus      H/O partial thyroidectomy      MEDICATIONS  (STANDING):  acetaminophen     Tablet .. 975 milliGRAM(s) Oral every 6 hours  heparin   Injectable 5000 Unit(s) SubCutaneous every 12 hours  ibuprofen  Tablet. 600 milliGRAM(s) Oral <User Schedule>    MEDICATIONS  (PRN):  ondansetron Injectable 4 milliGRAM(s) IV Push every 6 hours PRN Nausea and/or Vomiting  oxyCODONE    IR 5 milliGRAM(s) Oral every 4 hours PRN Severe Pain (7 - 10)  oxyCODONE    IR 2.5 milliGRAM(s) Oral every 4 hours PRN Moderate Pain (4 - 6)    PE: Vital Signs Last 24 Hrs  T(C): 37 (08 Aug 2023 05:24), Max: 37.1 (07 Aug 2023 21:15)  T(F): 98.6 (08 Aug 2023 05:24), Max: 98.8 (07 Aug 2023 21:15)  HR: 92 (08 Aug 2023 05:24) (74 - 96)  BP: 101/65 (08 Aug 2023 05:24) (101/65 - 128/83)  BP(mean): --  RR: 15 (08 Aug 2023 05:24) (12 - 20)  SpO2: 97% (08 Aug 2023 05:24) (96% - 99%)    Parameters below as of 08 Aug 2023 01:15  Patient On (Oxygen Delivery Method): room air    Gen:A &O x3 nad  abd: dressing removed incision soft, dry steri strips intact, no hematoma noted, abd soft, nd, tender on incision  Gu: voiding   LE: calfs soft, nontender, no swelling                             11.4   7.84  )-----------( 168      ( 08 Aug 2023 06:36 )             33.3   08-08    137  |  102  |  13  ----------------------------<  119<H>  3.5   |  28  |  0.72    Ca    7.4<L>      08 Aug 2023 06:36

## 2023-08-08 NOTE — PROGRESS NOTE ADULT - PROBLEM SELECTOR PLAN 1
POD 1   OOB  Reg diet  pain management  DC parnell/TOV  dvt/pe prophylaxis  dc planning within 48 hrs  discussed with surgeon

## 2023-08-09 LAB
ANION GAP SERPL CALC-SCNC: 5 MMOL/L — SIGNIFICANT CHANGE UP (ref 5–17)
BUN SERPL-MCNC: 15 MG/DL — SIGNIFICANT CHANGE UP (ref 7–23)
CALCIUM SERPL-MCNC: 7.2 MG/DL — LOW (ref 8.4–10.5)
CHLORIDE SERPL-SCNC: 106 MMOL/L — SIGNIFICANT CHANGE UP (ref 96–108)
CO2 SERPL-SCNC: 29 MMOL/L — SIGNIFICANT CHANGE UP (ref 22–31)
CREAT SERPL-MCNC: 0.82 MG/DL — SIGNIFICANT CHANGE UP (ref 0.5–1.3)
EGFR: 91 ML/MIN/1.73M2 — SIGNIFICANT CHANGE UP
GLUCOSE SERPL-MCNC: 111 MG/DL — HIGH (ref 70–99)
HCT VFR BLD CALC: 31.6 % — LOW (ref 34.5–45)
HGB BLD-MCNC: 11 G/DL — LOW (ref 11.5–15.5)
MCHC RBC-ENTMCNC: 31.6 PG — SIGNIFICANT CHANGE UP (ref 27–34)
MCHC RBC-ENTMCNC: 34.8 GM/DL — SIGNIFICANT CHANGE UP (ref 32–36)
MCV RBC AUTO: 90.8 FL — SIGNIFICANT CHANGE UP (ref 80–100)
NRBC # BLD: 0 /100 WBCS — SIGNIFICANT CHANGE UP (ref 0–0)
PLATELET # BLD AUTO: 149 K/UL — LOW (ref 150–400)
POTASSIUM SERPL-MCNC: 3.5 MMOL/L — SIGNIFICANT CHANGE UP (ref 3.5–5.3)
POTASSIUM SERPL-SCNC: 3.5 MMOL/L — SIGNIFICANT CHANGE UP (ref 3.5–5.3)
RBC # BLD: 3.48 M/UL — LOW (ref 3.8–5.2)
RBC # FLD: 12 % — SIGNIFICANT CHANGE UP (ref 10.3–14.5)
SODIUM SERPL-SCNC: 140 MMOL/L — SIGNIFICANT CHANGE UP (ref 135–145)
WBC # BLD: 5.42 K/UL — SIGNIFICANT CHANGE UP (ref 3.8–10.5)
WBC # FLD AUTO: 5.42 K/UL — SIGNIFICANT CHANGE UP (ref 3.8–10.5)

## 2023-08-09 RX ORDER — BACITRACIN ZINC 500 UNIT/G
1 OINTMENT IN PACKET (EA) TOPICAL THREE TIMES A DAY
Refills: 0 | Status: DISCONTINUED | OUTPATIENT
Start: 2023-08-09 | End: 2023-08-10

## 2023-08-09 RX ADMIN — OXYCODONE HYDROCHLORIDE 5 MILLIGRAM(S): 5 TABLET ORAL at 20:47

## 2023-08-09 RX ADMIN — Medication 975 MILLIGRAM(S): at 00:27

## 2023-08-09 RX ADMIN — Medication 975 MILLIGRAM(S): at 12:00

## 2023-08-09 RX ADMIN — HEPARIN SODIUM 5000 UNIT(S): 5000 INJECTION INTRAVENOUS; SUBCUTANEOUS at 05:23

## 2023-08-09 RX ADMIN — Medication 1 APPLICATION(S): at 14:04

## 2023-08-09 RX ADMIN — OXYCODONE HYDROCHLORIDE 5 MILLIGRAM(S): 5 TABLET ORAL at 21:47

## 2023-08-09 RX ADMIN — Medication 600 MILLIGRAM(S): at 03:54

## 2023-08-09 RX ADMIN — OXYCODONE HYDROCHLORIDE 5 MILLIGRAM(S): 5 TABLET ORAL at 14:41

## 2023-08-09 RX ADMIN — Medication 600 MILLIGRAM(S): at 03:24

## 2023-08-09 RX ADMIN — OXYCODONE HYDROCHLORIDE 5 MILLIGRAM(S): 5 TABLET ORAL at 09:09

## 2023-08-09 RX ADMIN — Medication 600 MILLIGRAM(S): at 18:37

## 2023-08-09 RX ADMIN — OXYCODONE HYDROCHLORIDE 5 MILLIGRAM(S): 5 TABLET ORAL at 10:22

## 2023-08-09 RX ADMIN — Medication 1 APPLICATION(S): at 20:51

## 2023-08-09 RX ADMIN — Medication 600 MILLIGRAM(S): at 20:47

## 2023-08-09 RX ADMIN — Medication 975 MILLIGRAM(S): at 05:53

## 2023-08-09 RX ADMIN — Medication 600 MILLIGRAM(S): at 14:04

## 2023-08-09 RX ADMIN — Medication 975 MILLIGRAM(S): at 18:36

## 2023-08-09 RX ADMIN — ONDANSETRON 4 MILLIGRAM(S): 8 TABLET, FILM COATED ORAL at 09:09

## 2023-08-09 RX ADMIN — Medication 600 MILLIGRAM(S): at 21:47

## 2023-08-09 RX ADMIN — HEPARIN SODIUM 5000 UNIT(S): 5000 INJECTION INTRAVENOUS; SUBCUTANEOUS at 17:23

## 2023-08-09 RX ADMIN — Medication 600 MILLIGRAM(S): at 10:22

## 2023-08-09 RX ADMIN — Medication 600 MILLIGRAM(S): at 08:44

## 2023-08-09 RX ADMIN — Medication 975 MILLIGRAM(S): at 17:23

## 2023-08-09 RX ADMIN — Medication 975 MILLIGRAM(S): at 05:23

## 2023-08-09 NOTE — PHYSICAL THERAPY INITIAL EVALUATION ADULT - ADDITIONAL COMMENTS
pt lives alone in private ranch house, 0 wendy. pt independent w/ambulation and ADL's, +, works as hairdresser

## 2023-08-09 NOTE — PROGRESS NOTE ADULT - ASSESSMENT
42F now POD#2 s/p open myomectomy.   - Appreciate PT eval  - F/u AM labs  - Full liquid diet as tolerated  - Anti-emetics with analgesics PRN to avoid nausea  - OOB, ambulate  - Bacitracin to skin blister on RLQ, cover with bandaid  Plan discussed with Dr. Garcia
42y Female admitted POD # 0 from Abdominal myomectomy for uterine fibroids        PLAN:    -serial exams, any changes in exam to be escelated to surgical team immedietly  -pain control  -finished post op anbx  -started on clear liquid diet  -dressing changes per surgical team  -DVT prophylaxis  -AM labs         TIME SPENT:  35 minutes of  time spent providing medical care for patient's acute illness/conditions that impairs at least one vital organ system and/or poses a high risk of imminent or life threatening deterioration in the patient's condition. It includes time spent evaluating and treating the patient's acute illness as well as time spent reviewing labs, radiology, discussing goals of care with patient and/or patient's family, and discussing the case with a multidisciplinary team, including the eICU, in an effort to prevent further life threatening deterioration or end organ damage. This time is independent of any procedures performed. 
POD 1

## 2023-08-09 NOTE — PROGRESS NOTE ADULT - TIME BILLING
I have personally seen and examined the patient and agree with the above stated assessment and plan.    Patient educated thoroughly on requesting oxycodone for moderate to severe pain.    Tolerating full liquid diet. No reported episodes of emesis.    Continuing to void freely, no signs of infection, HDS.    ADAT tomorrow as tolerated.    PT eval complete, appreciate consultation      DANI Palmer Attending
as above

## 2023-08-09 NOTE — PROGRESS NOTE ADULT - SUBJECTIVE AND OBJECTIVE BOX
INTERVAL HPI/OVERNIGHT EVENTS:  Pt seen and examined at bedside resting comfortably. No acute events reported overnight. Pt complaining of pain, not well controlled. Pt reports only taking tylenol/motrin, encouraged to take opioids as needed for severe pain. Pt also complaining of nausea and dizziness. Tolerating liquids, unable to eat regular diet due to nausea.   Denies fever/chills, chest pain, dyspnea, cough, or calf tenderness.     Vital Signs Last 24 Hrs  T(C): 37.4 (09 Aug 2023 08:26), Max: 37.4 (09 Aug 2023 08:26)  T(F): 99.3 (09 Aug 2023 08:26), Max: 99.3 (09 Aug 2023 08:26)  HR: 88 (09 Aug 2023 08:26) (84 - 94)  BP: 101/60 (09 Aug 2023 11:42) (93/60 - 121/82)  BP(mean): 69 (08 Aug 2023 23:59) (69 - 69)  RR: 17 (09 Aug 2023 08:26) (16 - 18)  SpO2: 98% (09 Aug 2023 11:42) (96% - 100%)    Parameters below as of 09 Aug 2023 11:42  Patient On (Oxygen Delivery Method): room air      PHYSICAL EXAM:  GENERAL: awake and alert, NAD  HEAD: Atraumatic, Normocephalic  EYES: EOMI  CHEST/LUNG: non-labored breathing  HEART: normal HR  ABDOMEN: soft, appropriately tender to palpation, ND, surgical site C/D/I, skin blister noted on RLQ due to binder in place  EXTREMITIES: no calf tenderness b/l    I&O's Detail    08 Aug 2023 07:01  -  09 Aug 2023 07:00  --------------------------------------------------------  IN:  Total IN: 0 mL    OUT:    Voided (mL): 850 mL  Total OUT: 850 mL    Total NET: -850 mL          LABS:                        11.0   5.42  )-----------( 149      ( 09 Aug 2023 09:35 )             31.6     08-09    140  |  106  |  15  ----------------------------<  111<H>  3.5   |  29  |  0.82    Ca    7.2<L>      09 Aug 2023 09:35        Urinalysis Basic - ( 09 Aug 2023 09:35 )    Color: x / Appearance: x / SG: x / pH: x  Gluc: 111 mg/dL / Ketone: x  / Bili: x / Urobili: x   Blood: x / Protein: x / Nitrite: x   Leuk Esterase: x / RBC: x / WBC x   Sq Epi: x / Non Sq Epi: x / Bacteria: x   INTERVAL HPI/OVERNIGHT EVENTS:  Pt seen and examined at bedside resting comfortably. No acute events reported overnight. Pt complaining of pain, not well controlled. Pt reports only taking tylenol/motrin, encouraged to take opioids as needed for severe pain. Pt also complaining of nausea and dizziness. Tolerating liquids, unable to eat regular diet due to nausea.   Denies fever/chills, chest pain, dyspnea, cough, or calf tenderness.     Vital Signs Last 24 Hrs  T(C): 37.4 (09 Aug 2023 08:26), Max: 37.4 (09 Aug 2023 08:26)  T(F): 99.3 (09 Aug 2023 08:26), Max: 99.3 (09 Aug 2023 08:26)  HR: 88 (09 Aug 2023 08:26) (84 - 94)  BP: 101/60 (09 Aug 2023 11:42) (93/60 - 121/82)  BP(mean): 69 (08 Aug 2023 23:59) (69 - 69)  RR: 17 (09 Aug 2023 08:26) (16 - 18)  SpO2: 98% (09 Aug 2023 11:42) (96% - 100%)    Parameters below as of 09 Aug 2023 11:42  Patient On (Oxygen Delivery Method): room air      PHYSICAL EXAM:  GENERAL: awake and alert, NAD  HEAD: Atraumatic, Normocephalic  EYES: EOMI  CHEST/LUNG: non-labored breathing  HEART: normal HR  ABDOMEN: soft, appropriately tender to palpation, ND, surgical site C/D/I, skin blister noted on RLQ due to binder in place, BS+, not hyperactive  EXTREMITIES: no calf tenderness b/l    I&O's Detail    08 Aug 2023 07:01  -  09 Aug 2023 07:00  --------------------------------------------------------  IN:  Total IN: 0 mL    OUT:    Voided (mL): 850 mL  Total OUT: 850 mL    Total NET: -850 mL          LABS:                        11.0   5.42  )-----------( 149      ( 09 Aug 2023 09:35 )             31.6     08-09    140  |  106  |  15  ----------------------------<  111<H>  3.5   |  29  |  0.82    Ca    7.2<L>      09 Aug 2023 09:35        Urinalysis Basic - ( 09 Aug 2023 09:35 )    Color: x / Appearance: x / SG: x / pH: x  Gluc: 111 mg/dL / Ketone: x  / Bili: x / Urobili: x   Blood: x / Protein: x / Nitrite: x   Leuk Esterase: x / RBC: x / WBC x   Sq Epi: x / Non Sq Epi: x / Bacteria: x

## 2023-08-10 ENCOUNTER — TRANSCRIPTION ENCOUNTER (OUTPATIENT)
Age: 43
End: 2023-08-10

## 2023-08-10 VITALS — DIASTOLIC BLOOD PRESSURE: 78 MMHG | SYSTOLIC BLOOD PRESSURE: 118 MMHG

## 2023-08-10 LAB
ANION GAP SERPL CALC-SCNC: 5 MMOL/L — SIGNIFICANT CHANGE UP (ref 5–17)
BUN SERPL-MCNC: 14 MG/DL — SIGNIFICANT CHANGE UP (ref 7–23)
CALCIUM SERPL-MCNC: 7.7 MG/DL — LOW (ref 8.4–10.5)
CHLORIDE SERPL-SCNC: 104 MMOL/L — SIGNIFICANT CHANGE UP (ref 96–108)
CO2 SERPL-SCNC: 30 MMOL/L — SIGNIFICANT CHANGE UP (ref 22–31)
CREAT SERPL-MCNC: 0.78 MG/DL — SIGNIFICANT CHANGE UP (ref 0.5–1.3)
EGFR: 97 ML/MIN/1.73M2 — SIGNIFICANT CHANGE UP
GLUCOSE SERPL-MCNC: 107 MG/DL — HIGH (ref 70–99)
HCT VFR BLD CALC: 31.4 % — LOW (ref 34.5–45)
HGB BLD-MCNC: 10.8 G/DL — LOW (ref 11.5–15.5)
MCHC RBC-ENTMCNC: 30.9 PG — SIGNIFICANT CHANGE UP (ref 27–34)
MCHC RBC-ENTMCNC: 34.4 GM/DL — SIGNIFICANT CHANGE UP (ref 32–36)
MCV RBC AUTO: 90 FL — SIGNIFICANT CHANGE UP (ref 80–100)
NRBC # BLD: 0 /100 WBCS — SIGNIFICANT CHANGE UP (ref 0–0)
PLATELET # BLD AUTO: 142 K/UL — LOW (ref 150–400)
POTASSIUM SERPL-MCNC: 3.3 MMOL/L — LOW (ref 3.5–5.3)
POTASSIUM SERPL-SCNC: 3.3 MMOL/L — LOW (ref 3.5–5.3)
RBC # BLD: 3.49 M/UL — LOW (ref 3.8–5.2)
RBC # FLD: 11.9 % — SIGNIFICANT CHANGE UP (ref 10.3–14.5)
SODIUM SERPL-SCNC: 139 MMOL/L — SIGNIFICANT CHANGE UP (ref 135–145)
WBC # BLD: 4.52 K/UL — SIGNIFICANT CHANGE UP (ref 3.8–10.5)
WBC # FLD AUTO: 4.52 K/UL — SIGNIFICANT CHANGE UP (ref 3.8–10.5)

## 2023-08-10 PROCEDURE — 80048 BASIC METABOLIC PNL TOTAL CA: CPT

## 2023-08-10 PROCEDURE — C1889: CPT

## 2023-08-10 PROCEDURE — 88305 TISSUE EXAM BY PATHOLOGIST: CPT

## 2023-08-10 PROCEDURE — 85027 COMPLETE CBC AUTOMATED: CPT

## 2023-08-10 PROCEDURE — C9399: CPT

## 2023-08-10 PROCEDURE — C1765: CPT

## 2023-08-10 PROCEDURE — 97162 PT EVAL MOD COMPLEX 30 MIN: CPT

## 2023-08-10 PROCEDURE — 36415 COLL VENOUS BLD VENIPUNCTURE: CPT

## 2023-08-10 RX ORDER — POTASSIUM CHLORIDE 20 MEQ
40 PACKET (EA) ORAL ONCE
Refills: 0 | Status: COMPLETED | OUTPATIENT
Start: 2023-08-10 | End: 2023-08-10

## 2023-08-10 RX ORDER — OXYCODONE HYDROCHLORIDE 5 MG/1
1 TABLET ORAL
Qty: 15 | Refills: 0
Start: 2023-08-10

## 2023-08-10 RX ADMIN — Medication 975 MILLIGRAM(S): at 11:38

## 2023-08-10 RX ADMIN — Medication 1 APPLICATION(S): at 06:22

## 2023-08-10 RX ADMIN — HEPARIN SODIUM 5000 UNIT(S): 5000 INJECTION INTRAVENOUS; SUBCUTANEOUS at 06:22

## 2023-08-10 RX ADMIN — OXYCODONE HYDROCHLORIDE 5 MILLIGRAM(S): 5 TABLET ORAL at 12:33

## 2023-08-10 RX ADMIN — Medication 600 MILLIGRAM(S): at 08:31

## 2023-08-10 RX ADMIN — Medication 975 MILLIGRAM(S): at 06:16

## 2023-08-10 RX ADMIN — OXYCODONE HYDROCHLORIDE 5 MILLIGRAM(S): 5 TABLET ORAL at 06:47

## 2023-08-10 RX ADMIN — OXYCODONE HYDROCHLORIDE 5 MILLIGRAM(S): 5 TABLET ORAL at 06:17

## 2023-08-10 RX ADMIN — Medication 600 MILLIGRAM(S): at 15:10

## 2023-08-10 RX ADMIN — Medication 975 MILLIGRAM(S): at 01:30

## 2023-08-10 RX ADMIN — Medication 600 MILLIGRAM(S): at 03:30

## 2023-08-10 RX ADMIN — Medication 600 MILLIGRAM(S): at 04:00

## 2023-08-10 RX ADMIN — Medication 40 MILLIEQUIVALENT(S): at 11:38

## 2023-08-10 RX ADMIN — Medication 975 MILLIGRAM(S): at 06:46

## 2023-08-10 RX ADMIN — Medication 1 APPLICATION(S): at 15:10

## 2023-08-10 RX ADMIN — Medication 975 MILLIGRAM(S): at 00:30

## 2023-08-10 NOTE — DISCHARGE NOTE PROVIDER - NSDCFUSCHEDAPPT_GEN_ALL_CORE_FT
Mila Garcia  [facility] Physician [facility]  OBGYNGEN [address]  Scheduled Appointment: 08/24/2023    [facility]  GENSURG [address] [facility] [facility]  Scheduled Appointment: 11/07/2023     No

## 2023-08-10 NOTE — DISCHARGE NOTE PROVIDER - CARE PROVIDER_API CALL
Mila Garcia  Obstetrics and Gynecology  [address] [place] [address], [address]  [place] [address]  Phone: [phone]  Fax: [phone]  Follow Up Time: 2 weeks

## 2023-08-10 NOTE — DISCHARGE NOTE PROVIDER - NSDCFUADDINST_GEN_ALL_CORE_FT
Pain medication is given immediately following your surgery to help with post-operative pain. Upon  your discharge home, we suggest scheduled doses of Acetaminophen (tylenol) every 6 hours and  Ibuprofen (Motrin) every 6 hours, alternating between medications every 3 hours (i.e. Take tylenol  and wait 3 hours, then take Motrin and wait 3 hours, repeat) for the first 3-4 days after surgery. If  you are without significant pain, medications can be taken more infrequently. It is important to  follow dosing instructions on the medication bottle or prescription.

## 2023-08-10 NOTE — DISCHARGE NOTE NURSING/CASE MANAGEMENT/SOCIAL WORK - NSDCPEFALRISK_GEN_ALL_CORE
For information on Fall & Injury Prevention, visit: [email or web address] OR  [email or web address] OR  https://www.cdc.gov/steadi/patient.html

## 2023-08-10 NOTE — DISCHARGE NOTE PROVIDER - NSDCMRMEDTOKEN_GEN_ALL_CORE_FT
Advil 200 mg oral tablet: 1 tab(s) orally 3 times a day as needed for  mild pain  antibiotic prior to surgery: 1 tab PO Daily  Collagen Skin Renewal 30 mg-833.33 mg oral tablet: 3 tab(s) orally once a day  oxyCODONE 5 mg oral tablet: 1 tab(s) orally every 6 hours as needed for  severe pain MDD: 4  Vit c: 1 tab(s)  once a day; pt dc d 2 weeks ago   vitamin d: 1 tab(s)  once a day; pt dc d 2 weeks ago

## 2023-08-10 NOTE — DISCHARGE NOTE PROVIDER - HOSPITAL COURSE
42 yof with no significant PMH s/p open myomectomy with Dr Garcia on 08/07/2023.  Postoperative pt experienced Nausea and abd pain; improved with medication. POD 2 patient advanced to regular diet, ambulating and voiding. Pt seen by surgeon this morning and deemed stable for discharge if tolerates diet and ambulates.

## 2023-08-10 NOTE — DISCHARGE NOTE NURSING/CASE MANAGEMENT/SOCIAL WORK - PATIENT PORTAL LINK FT
You can access the FollowMyHealth Patient Portal offered by [facility] by registering at the following website: [email or web address]. By joining [employer]’s FollowMyHealth portal, you will also be able to view your health information using other applications (apps) compatible with our system.

## 2023-08-14 RX ORDER — IBUPROFEN 600 MG/1
600 TABLET, FILM COATED ORAL
Qty: 40 | Refills: 1 | Status: ACTIVE | COMMUNITY
Start: 2023-08-14 | End: 1900-01-01

## 2023-08-14 RX ORDER — ACETAMINOPHEN 500 MG/1
500 TABLET ORAL
Qty: 60 | Refills: 0 | Status: ACTIVE | COMMUNITY
Start: 2023-08-14 | End: 1900-01-01

## 2023-08-14 RX ORDER — OXYCODONE 5 MG/1
5 TABLET ORAL EVERY 6 HOURS
Qty: 7 | Refills: 0 | Status: ACTIVE | COMMUNITY
Start: 2023-08-14 | End: 1900-01-01

## 2023-08-15 LAB — SURGICAL PATHOLOGY STUDY: SIGNIFICANT CHANGE UP

## 2023-08-24 ENCOUNTER — APPOINTMENT (OUTPATIENT)
Dept: OBGYN | Facility: CLINIC | Age: 43
End: 2023-08-24
Payer: MEDICAID

## 2023-08-24 VITALS
TEMPERATURE: 97.9 F | DIASTOLIC BLOOD PRESSURE: 70 MMHG | BODY MASS INDEX: 20.14 KG/M2 | SYSTOLIC BLOOD PRESSURE: 110 MMHG | HEART RATE: 83 BPM | WEIGHT: 118 LBS | HEIGHT: 64 IN | OXYGEN SATURATION: 98 %

## 2023-08-24 DIAGNOSIS — Z98.890 OTHER SPECIFIED POSTPROCEDURAL STATES: ICD-10-CM

## 2023-08-24 LAB
HCG UR QL: NEGATIVE
QUALITY CONTROL: YES

## 2023-08-24 PROCEDURE — 99024 POSTOP FOLLOW-UP VISIT: CPT

## 2023-08-24 NOTE — PLAN
[FreeTextEntry1] :   I spent the time noted on the day of this patient encounter preparing for, providing and documenting the above service. I have  counseled and educated the patient on the differential, workup, disease course, and treatment/management plan. Education was provided to the patient during this encounter. All questions and concerns were answered and addressed in detail. Mila Garcia MD

## 2023-08-24 NOTE — PHYSICAL EXAM
[Appropriately responsive] : appropriately responsive [Alert] : alert [No Acute Distress] : no acute distress [No Lymphadenopathy] : no lymphadenopathy [Soft] : soft [Non-tender] : non-tender [Non-distended] : non-distended [No HSM] : No HSM [No Lesions] : no lesions [No Mass] : no mass [Oriented x3] : oriented x3 [FreeTextEntry7] : incisions clean, dry, intact, steris removed

## 2023-08-24 NOTE — HISTORY OF PRESENT ILLNESS
[FreeTextEntry1] : POA, path reviewed, benign. No complaints   Final Diagnosis Uterus, myomectomy: -Leiomyomas

## 2023-10-23 ENCOUNTER — APPOINTMENT (OUTPATIENT)
Dept: OBGYN | Facility: CLINIC | Age: 43
End: 2023-10-23
Payer: MEDICAID

## 2023-10-23 VITALS
BODY MASS INDEX: 20.49 KG/M2 | HEART RATE: 93 BPM | DIASTOLIC BLOOD PRESSURE: 60 MMHG | WEIGHT: 120 LBS | TEMPERATURE: 97.7 F | SYSTOLIC BLOOD PRESSURE: 92 MMHG | OXYGEN SATURATION: 98 % | HEIGHT: 64 IN

## 2023-10-23 DIAGNOSIS — R10.2 PELVIC AND PERINEAL PAIN: ICD-10-CM

## 2023-10-23 PROCEDURE — 99024 POSTOP FOLLOW-UP VISIT: CPT

## 2023-10-31 NOTE — PRE-OP CHECKLIST - NEEDLE GAUGE
20
Quality 226: Preventive Care And Screening: Tobacco Use: Screening And Cessation Intervention: Patient screened for tobacco use and is an ex/non-smoker
Quality 130: Documentation Of Current Medications In The Medical Record: Current Medications Documented
Detail Level: Detailed
Quality 47: Advance Care Plan: Advance care planning not documented, reason not otherwise specified.
Quality 431: Preventive Care And Screening: Unhealthy Alcohol Use - Screening: Patient not identified as an unhealthy alcohol user when screened for unhealthy alcohol use using a systematic screening method

## 2023-11-02 ENCOUNTER — APPOINTMENT (OUTPATIENT)
Dept: ULTRASOUND IMAGING | Facility: HOSPITAL | Age: 43
End: 2023-11-02

## 2023-11-06 ENCOUNTER — APPOINTMENT (OUTPATIENT)
Dept: ULTRASOUND IMAGING | Facility: HOSPITAL | Age: 43
End: 2023-11-06

## 2023-11-07 ENCOUNTER — APPOINTMENT (OUTPATIENT)
Dept: SURGERY | Facility: CLINIC | Age: 43
End: 2023-11-07

## 2024-03-05 ENCOUNTER — APPOINTMENT (OUTPATIENT)
Dept: ULTRASOUND IMAGING | Facility: HOSPITAL | Age: 44
End: 2024-03-05
Payer: COMMERCIAL

## 2024-03-05 ENCOUNTER — OUTPATIENT (OUTPATIENT)
Dept: OUTPATIENT SERVICES | Facility: HOSPITAL | Age: 44
LOS: 1 days | End: 2024-03-05
Payer: MEDICARE

## 2024-03-05 DIAGNOSIS — E89.0 POSTPROCEDURAL HYPOTHYROIDISM: Chronic | ICD-10-CM

## 2024-03-05 DIAGNOSIS — R10.2 PELVIC AND PERINEAL PAIN: ICD-10-CM

## 2024-03-05 PROCEDURE — 76830 TRANSVAGINAL US NON-OB: CPT | Mod: 26

## 2024-03-05 PROCEDURE — 76830 TRANSVAGINAL US NON-OB: CPT

## 2024-03-14 ENCOUNTER — NON-APPOINTMENT (OUTPATIENT)
Age: 44
End: 2024-03-14

## 2024-03-27 ENCOUNTER — APPOINTMENT (OUTPATIENT)
Dept: OBGYN | Facility: CLINIC | Age: 44
End: 2024-03-27
Payer: COMMERCIAL

## 2024-03-27 VITALS
BODY MASS INDEX: 20.66 KG/M2 | OXYGEN SATURATION: 98 % | WEIGHT: 121 LBS | HEIGHT: 64 IN | TEMPERATURE: 97.8 F | DIASTOLIC BLOOD PRESSURE: 68 MMHG | SYSTOLIC BLOOD PRESSURE: 100 MMHG | HEART RATE: 101 BPM

## 2024-03-27 DIAGNOSIS — N93.9 ABNORMAL UTERINE AND VAGINAL BLEEDING, UNSPECIFIED: ICD-10-CM

## 2024-03-27 LAB
HCG UR QL: NEGATIVE
QUALITY CONTROL: YES

## 2024-03-27 PROCEDURE — 58100 BIOPSY OF UTERUS LINING: CPT

## 2024-03-27 PROCEDURE — 99214 OFFICE O/P EST MOD 30 MIN: CPT | Mod: 25

## 2024-03-27 RX ORDER — NORETHINDRONE ACETATE 5 MG/1
5 TABLET ORAL DAILY
Qty: 14 | Refills: 1 | Status: ACTIVE | COMMUNITY
Start: 2024-03-27 | End: 1900-01-01

## 2024-03-28 LAB
ESTRADIOL SERPL-MCNC: 75 PG/ML
FSH SERPL-MCNC: 7.8 IU/L
PROLACTIN SERPL-MCNC: 21.4 NG/ML
TSH SERPL-ACNC: 1.73 UIU/ML

## 2024-03-29 ENCOUNTER — NON-APPOINTMENT (OUTPATIENT)
Age: 44
End: 2024-03-29

## 2024-04-08 NOTE — PHYSICAL EXAM
[Appropriately responsive] : appropriately responsive [Alert] : alert [No Acute Distress] : no acute distress [No Lymphadenopathy] : no lymphadenopathy [Soft] : soft [Non-tender] : non-tender [Non-distended] : non-distended [No HSM] : No HSM [No Lesions] : no lesions [No Mass] : no mass [Oriented x3] : oriented x3 [FreeTextEntry1] : see procedure note

## 2024-05-13 ENCOUNTER — OUTPATIENT (OUTPATIENT)
Dept: OUTPATIENT SERVICES | Facility: HOSPITAL | Age: 44
LOS: 1 days | End: 2024-05-13
Payer: MEDICARE

## 2024-05-13 ENCOUNTER — APPOINTMENT (OUTPATIENT)
Dept: ULTRASOUND IMAGING | Facility: HOSPITAL | Age: 44
End: 2024-05-13
Payer: COMMERCIAL

## 2024-05-13 DIAGNOSIS — E89.0 POSTPROCEDURAL HYPOTHYROIDISM: Chronic | ICD-10-CM

## 2024-05-13 DIAGNOSIS — N83.209 UNSPECIFIED OVARIAN CYST, UNSPECIFIED SIDE: ICD-10-CM

## 2024-05-13 PROCEDURE — 76830 TRANSVAGINAL US NON-OB: CPT | Mod: 26

## 2024-05-13 PROCEDURE — 76830 TRANSVAGINAL US NON-OB: CPT
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
## 2024-12-09 NOTE — H&P PST ADULT - VASCULAR
No care due was identified.  [facility] Embedded Care Due Messages. Reference number: [id number].   12/09/2024 11:20:26 AM CST   detailed exam

## 2024-12-23 ENCOUNTER — APPOINTMENT (OUTPATIENT)
Dept: MAMMOGRAPHY | Facility: HOSPITAL | Age: 44
End: 2024-12-23

## 2024-12-23 ENCOUNTER — OUTPATIENT (OUTPATIENT)
Dept: OUTPATIENT SERVICES | Facility: HOSPITAL | Age: 44
LOS: 1 days | End: 2024-12-23
Payer: COMMERCIAL

## 2024-12-23 DIAGNOSIS — E89.0 POSTPROCEDURAL HYPOTHYROIDISM: Chronic | ICD-10-CM

## 2024-12-23 DIAGNOSIS — Z00.8 ENCOUNTER FOR OTHER GENERAL EXAMINATION: ICD-10-CM

## 2024-12-23 PROCEDURE — 77067 SCR MAMMO BI INCL CAD: CPT

## 2024-12-23 PROCEDURE — 77063 BREAST TOMOSYNTHESIS BI: CPT

## 2024-12-23 PROCEDURE — 77067 SCR MAMMO BI INCL CAD: CPT | Mod: 26

## 2024-12-23 PROCEDURE — 77063 BREAST TOMOSYNTHESIS BI: CPT | Mod: 26

## 2025-02-03 ENCOUNTER — APPOINTMENT (OUTPATIENT)
Dept: MAMMOGRAPHY | Facility: HOSPITAL | Age: 45
End: 2025-02-03
Payer: COMMERCIAL

## 2025-02-03 ENCOUNTER — OUTPATIENT (OUTPATIENT)
Dept: OUTPATIENT SERVICES | Facility: HOSPITAL | Age: 45
LOS: 1 days | End: 2025-02-03
Payer: COMMERCIAL

## 2025-02-03 ENCOUNTER — APPOINTMENT (OUTPATIENT)
Dept: ULTRASOUND IMAGING | Facility: HOSPITAL | Age: 45
End: 2025-02-03
Payer: COMMERCIAL

## 2025-02-03 DIAGNOSIS — Z00.8 ENCOUNTER FOR OTHER GENERAL EXAMINATION: ICD-10-CM

## 2025-02-03 DIAGNOSIS — E89.0 POSTPROCEDURAL HYPOTHYROIDISM: Chronic | ICD-10-CM

## 2025-02-03 PROCEDURE — 76641 ULTRASOUND BREAST COMPLETE: CPT | Mod: 26,RT

## 2025-02-03 PROCEDURE — 77065 DX MAMMO INCL CAD UNI: CPT

## 2025-02-03 PROCEDURE — G0279: CPT

## 2025-02-03 PROCEDURE — G0279: CPT | Mod: 26

## 2025-02-03 PROCEDURE — 77065 DX MAMMO INCL CAD UNI: CPT | Mod: 26,RT

## 2025-02-03 PROCEDURE — 76641 ULTRASOUND BREAST COMPLETE: CPT

## 2025-03-24 ENCOUNTER — APPOINTMENT (OUTPATIENT)
Dept: OBGYN | Facility: CLINIC | Age: 45
End: 2025-03-24
Payer: COMMERCIAL

## 2025-03-24 VITALS
TEMPERATURE: 97.6 F | WEIGHT: 121 LBS | HEART RATE: 96 BPM | SYSTOLIC BLOOD PRESSURE: 110 MMHG | OXYGEN SATURATION: 98 % | BODY MASS INDEX: 20.66 KG/M2 | DIASTOLIC BLOOD PRESSURE: 76 MMHG | HEIGHT: 64 IN

## 2025-03-24 DIAGNOSIS — R10.2 PELVIC AND PERINEAL PAIN: ICD-10-CM

## 2025-03-24 DIAGNOSIS — N95.1 MENOPAUSAL AND FEMALE CLIMACTERIC STATES: ICD-10-CM

## 2025-03-24 DIAGNOSIS — Z01.419 ENCOUNTER FOR GYNECOLOGICAL EXAMINATION (GENERAL) (ROUTINE) W/OUT ABNORMAL FINDINGS: ICD-10-CM

## 2025-03-24 LAB
HCG UR QL: NEGATIVE
QUALITY CONTROL: YES

## 2025-03-24 PROCEDURE — 99212 OFFICE O/P EST SF 10 MIN: CPT | Mod: 25

## 2025-03-24 PROCEDURE — 99459 PELVIC EXAMINATION: CPT

## 2025-03-24 PROCEDURE — 99396 PREV VISIT EST AGE 40-64: CPT | Mod: 25

## 2025-03-31 LAB
C TRACH RRNA SPEC QL NAA+PROBE: NOT DETECTED
CYTOLOGY CVX/VAG DOC THIN PREP: NORMAL
FSH SERPL-MCNC: 15.8 IU/L
HPV HIGH+LOW RISK DNA PNL CVX: NOT DETECTED
N GONORRHOEA RRNA SPEC QL NAA+PROBE: NOT DETECTED
PROLACTIN SERPL-MCNC: 14.9 NG/ML
SOURCE AMPLIFICATION: NORMAL
TESTOST FREE SERPL-MCNC: 0.6 PG/ML
TESTOST SERPL-MCNC: 27.9 NG/DL
TSH SERPL-ACNC: 1.66 UIU/ML

## 2025-04-01 DIAGNOSIS — B37.89 OTHER SITES OF CANDIDIASIS: ICD-10-CM

## 2025-04-01 DIAGNOSIS — B37.9 CANDIDIASIS, UNSPECIFIED: ICD-10-CM

## 2025-04-01 LAB
CANDIDA VAG CYTO: DETECTED
ESTRADIOL SERPL-MCNC: 159 PG/ML
G VAGINALIS+PREV SP MTYP VAG QL MICRO: NOT DETECTED
T VAGINALIS VAG QL WET PREP: NOT DETECTED

## 2025-04-01 RX ORDER — FLUCONAZOLE 150 MG/1
150 TABLET ORAL
Qty: 3 | Refills: 0 | Status: ACTIVE | COMMUNITY
Start: 2025-04-01 | End: 1900-01-01

## 2025-05-05 ENCOUNTER — APPOINTMENT (OUTPATIENT)
Dept: ULTRASOUND IMAGING | Facility: HOSPITAL | Age: 45
End: 2025-05-05
Payer: COMMERCIAL

## 2025-05-05 ENCOUNTER — OUTPATIENT (OUTPATIENT)
Dept: OUTPATIENT SERVICES | Facility: HOSPITAL | Age: 45
LOS: 1 days | End: 2025-05-05
Payer: COMMERCIAL

## 2025-05-05 DIAGNOSIS — N95.1 MENOPAUSAL AND FEMALE CLIMACTERIC STATES: ICD-10-CM

## 2025-05-05 DIAGNOSIS — E89.0 POSTPROCEDURAL HYPOTHYROIDISM: Chronic | ICD-10-CM

## 2025-05-05 PROCEDURE — 76830 TRANSVAGINAL US NON-OB: CPT

## 2025-05-05 PROCEDURE — 76830 TRANSVAGINAL US NON-OB: CPT | Mod: 26
